# Patient Record
Sex: FEMALE | Race: WHITE | NOT HISPANIC OR LATINO | Employment: UNEMPLOYED | URBAN - METROPOLITAN AREA
[De-identification: names, ages, dates, MRNs, and addresses within clinical notes are randomized per-mention and may not be internally consistent; named-entity substitution may affect disease eponyms.]

---

## 2024-02-01 ENCOUNTER — TELEPHONE (OUTPATIENT)
Dept: INFECTIOUS DISEASES | Facility: CLINIC | Age: 34
End: 2024-02-01
Payer: COMMERCIAL

## 2024-02-01 NOTE — TELEPHONE ENCOUNTER
Attempted to contact pt to schedule appt. Pt did not answer. LVM to rtc to clinic. Pt scheduled first available with Dr. Kenny.

## 2024-02-01 NOTE — TELEPHONE ENCOUNTER
----- Message from Foreign Broussard sent at 2/1/2024  3:33 PM CST -----  Contact: Brook  Pt is calling in regards to getting appt for health issues due salmonella poison. Also, Pt neurologist well send referral if needed. Pt can be reached at [phone].      Thanks  GORDON

## 2024-02-07 ENCOUNTER — OFFICE VISIT (OUTPATIENT)
Dept: INFECTIOUS DISEASES | Facility: CLINIC | Age: 34
End: 2024-02-07
Payer: COMMERCIAL

## 2024-02-07 ENCOUNTER — LAB VISIT (OUTPATIENT)
Dept: LAB | Facility: HOSPITAL | Age: 34
End: 2024-02-07
Attending: STUDENT IN AN ORGANIZED HEALTH CARE EDUCATION/TRAINING PROGRAM
Payer: COMMERCIAL

## 2024-02-07 VITALS
DIASTOLIC BLOOD PRESSURE: 75 MMHG | WEIGHT: 129 LBS | BODY MASS INDEX: 20.73 KG/M2 | HEIGHT: 66 IN | SYSTOLIC BLOOD PRESSURE: 117 MMHG | HEART RATE: 70 BPM

## 2024-02-07 DIAGNOSIS — M79.2 NEURITIS: ICD-10-CM

## 2024-02-07 DIAGNOSIS — M79.2 NEURITIS: Primary | ICD-10-CM

## 2024-02-07 PROCEDURE — 87340 HEPATITIS B SURFACE AG IA: CPT | Performed by: STUDENT IN AN ORGANIZED HEALTH CARE EDUCATION/TRAINING PROGRAM

## 2024-02-07 PROCEDURE — 86635 COCCIDIOIDES ANTIBODY: CPT | Performed by: STUDENT IN AN ORGANIZED HEALTH CARE EDUCATION/TRAINING PROGRAM

## 2024-02-07 PROCEDURE — 1159F MED LIST DOCD IN RCRD: CPT | Mod: CPTII,S$GLB,, | Performed by: STUDENT IN AN ORGANIZED HEALTH CARE EDUCATION/TRAINING PROGRAM

## 2024-02-07 PROCEDURE — 87040 BLOOD CULTURE FOR BACTERIA: CPT | Mod: 59 | Performed by: STUDENT IN AN ORGANIZED HEALTH CARE EDUCATION/TRAINING PROGRAM

## 2024-02-07 PROCEDURE — 86704 HEP B CORE ANTIBODY TOTAL: CPT | Performed by: STUDENT IN AN ORGANIZED HEALTH CARE EDUCATION/TRAINING PROGRAM

## 2024-02-07 PROCEDURE — 86403 PARTICLE AGGLUT ANTBDY SCRN: CPT | Mod: 91 | Performed by: STUDENT IN AN ORGANIZED HEALTH CARE EDUCATION/TRAINING PROGRAM

## 2024-02-07 PROCEDURE — 86706 HEP B SURFACE ANTIBODY: CPT | Performed by: STUDENT IN AN ORGANIZED HEALTH CARE EDUCATION/TRAINING PROGRAM

## 2024-02-07 PROCEDURE — 87536 HIV-1 QUANT&REVRSE TRNSCRPJ: CPT | Performed by: STUDENT IN AN ORGANIZED HEALTH CARE EDUCATION/TRAINING PROGRAM

## 2024-02-07 PROCEDURE — 87449 NOS EACH ORGANISM AG IA: CPT | Mod: 91 | Performed by: STUDENT IN AN ORGANIZED HEALTH CARE EDUCATION/TRAINING PROGRAM

## 2024-02-07 PROCEDURE — 99999 PR PBB SHADOW E&M-EST. PATIENT-LVL III: CPT | Mod: PBBFAC,,, | Performed by: STUDENT IN AN ORGANIZED HEALTH CARE EDUCATION/TRAINING PROGRAM

## 2024-02-07 PROCEDURE — 99203 OFFICE O/P NEW LOW 30 MIN: CPT | Mod: S$GLB,,, | Performed by: STUDENT IN AN ORGANIZED HEALTH CARE EDUCATION/TRAINING PROGRAM

## 2024-02-07 PROCEDURE — 86618 LYME DISEASE ANTIBODY: CPT | Performed by: STUDENT IN AN ORGANIZED HEALTH CARE EDUCATION/TRAINING PROGRAM

## 2024-02-07 PROCEDURE — 86780 TREPONEMA PALLIDUM: CPT | Performed by: STUDENT IN AN ORGANIZED HEALTH CARE EDUCATION/TRAINING PROGRAM

## 2024-02-07 PROCEDURE — 86638 Q FEVER ANTIBODY: CPT | Performed by: STUDENT IN AN ORGANIZED HEALTH CARE EDUCATION/TRAINING PROGRAM

## 2024-02-07 PROCEDURE — 86789 WEST NILE VIRUS ANTIBODY: CPT | Performed by: STUDENT IN AN ORGANIZED HEALTH CARE EDUCATION/TRAINING PROGRAM

## 2024-02-07 PROCEDURE — 3074F SYST BP LT 130 MM HG: CPT | Mod: CPTII,S$GLB,, | Performed by: STUDENT IN AN ORGANIZED HEALTH CARE EDUCATION/TRAINING PROGRAM

## 2024-02-07 PROCEDURE — 86790 VIRUS ANTIBODY NOS: CPT | Performed by: STUDENT IN AN ORGANIZED HEALTH CARE EDUCATION/TRAINING PROGRAM

## 2024-02-07 PROCEDURE — 86803 HEPATITIS C AB TEST: CPT | Performed by: STUDENT IN AN ORGANIZED HEALTH CARE EDUCATION/TRAINING PROGRAM

## 2024-02-07 PROCEDURE — 86622 BRUCELLA ANTIBODY: CPT | Mod: 91 | Performed by: STUDENT IN AN ORGANIZED HEALTH CARE EDUCATION/TRAINING PROGRAM

## 2024-02-07 PROCEDURE — 87305 ASPERGILLUS AG IA: CPT | Performed by: STUDENT IN AN ORGANIZED HEALTH CARE EDUCATION/TRAINING PROGRAM

## 2024-02-07 PROCEDURE — 3078F DIAST BP <80 MM HG: CPT | Mod: CPTII,S$GLB,, | Performed by: STUDENT IN AN ORGANIZED HEALTH CARE EDUCATION/TRAINING PROGRAM

## 2024-02-07 PROCEDURE — 86611 BARTONELLA ANTIBODY: CPT | Mod: 91 | Performed by: STUDENT IN AN ORGANIZED HEALTH CARE EDUCATION/TRAINING PROGRAM

## 2024-02-07 PROCEDURE — 87449 NOS EACH ORGANISM AG IA: CPT | Performed by: STUDENT IN AN ORGANIZED HEALTH CARE EDUCATION/TRAINING PROGRAM

## 2024-02-07 PROCEDURE — 3008F BODY MASS INDEX DOCD: CPT | Mod: CPTII,S$GLB,, | Performed by: STUDENT IN AN ORGANIZED HEALTH CARE EDUCATION/TRAINING PROGRAM

## 2024-02-07 PROCEDURE — 86635 COCCIDIOIDES ANTIBODY: CPT | Mod: 91 | Performed by: STUDENT IN AN ORGANIZED HEALTH CARE EDUCATION/TRAINING PROGRAM

## 2024-02-07 PROCEDURE — 86592 SYPHILIS TEST NON-TREP QUAL: CPT | Performed by: STUDENT IN AN ORGANIZED HEALTH CARE EDUCATION/TRAINING PROGRAM

## 2024-02-07 RX ORDER — TOPIRAMATE 25 MG/1
1 TABLET ORAL DAILY
COMMUNITY
Start: 2024-02-01

## 2024-02-07 NOTE — ASSESSMENT & PLAN NOTE
--Using as umbrella term for patient's constellation of symptoms  --Unclear etiology  --CSF studies 2023 within normal limits  --MRI brain has been unrevealing   --Infectious differential includes but not limited to: HIV, West Nile, CMV, syphilis, Lyme, Brucella, and Cryptococcus   --Will order battery of serologic testing today   --As results come back will update patient   --Continue close neurology follow up   --Follow up with ID in 1 month

## 2024-02-07 NOTE — PROGRESS NOTES
Infectious Disease Clinic Note    Patient Name: Brook Abraham  YOB: 1990    PRESENTING HISTORY       History of Present Illness:  Ms. Brook Abraham is a 33 y.o. female w/ significant PMHx of PCOS here with concern regarding a constellation of neurologic symptoms for which she has not received a diagnosis. For over a year has been having symptoms of weakness and neuritis. Testing came back positive for certain infectious exposures back in mid . HSV, EBV, and Salmonella testing positive. Discussed results today. Likely had Salmonella gastroenteritis and mononucleosis in 2023 which would have both self resolved. Continues to have GI complications but in the realm of constipation. Always feels tired. Constantly losing balance which is worse past 2 months. Almost falls instantly when turning corners or getting up. Depth perception is gone. Not able to perceive the color red. Has been seeing neurologist. LP last year reviewed and normal. No fevers or chills. In the past year went on cruise to [facility] [place] in September and visited [place] for mother's . No farm animals where patient lives. Has 2 dogs. No gardening or yard work. No fishing, hiking, or camping. Patient denies breaking out in rashes. Has chronic nasal dryness. BP remains low normal. Has received all childhood vaccines. No sick contacts. Unclear if infection is etiology of neurologic symptoms. Explained that certain fungal and viral organisms are known to cause neurologic symptoms over time. Certain bacteria such as Treponema, Borrelia, and Brucella can also manifest neurologically. Patient denies tick exposure but has been regularly in cow pastures for paint ball. Will order battery of serologic testing today. Patient understands diagnosis remains unclear but will await test results.     Review of Systems:  Constitutional: no fever or chills  Eyes: chronic optic nerve damage   ENT: no nasal congestion or sore  throat  Respiratory: always coughing and short of breath   Cardiovascular: no chest pain  Gastrointestinal: no nausea or vomiting, no abdominal pain, constipation   Genitourinary: no hematuria or dysuria  Musculoskeletal: no arthralgias or myalgias  Skin: no rash  Neurological: migraines, neuritis, right eye discomfort, depth and red perception absent     The following portions of the patient's history were reviewed and updated as appropriate: allergies, current medications, past family history, past medical history, past social history, past surgical history, and problem list.    PAST HISTORY:       There is no immunization history on file for this patient.    Past Medical History:   Diagnosis Date    PCOS (polycystic ovarian syndrome)        Past Surgical History:   Procedure Laterality Date    DIAGNOSTIC LAPAROSCOPY         Family History   Problem Relation Age of Onset    Diabetes Paternal Grandfather     Diabetes Paternal Grandmother     Diabetes Maternal Grandmother     Breast cancer Maternal Grandmother     Diabetes Maternal Grandfather     Diabetes Mother     Ovarian cancer Mother        Social History     Socioeconomic History    Marital status:    Tobacco Use    Smoking status: Former     Types: Cigarettes    Smokeless tobacco: Never   Substance and Sexual Activity    Alcohol use: Yes    Drug use: Never    Sexual activity: Yes     Partners: Male       MEDICATIONS & ALLERGIES:     Current Outpatient Medications on File Prior to Visit   Medication Sig    topiramate (TOPAMAX) 25 MG tablet Take 1 tablet by mouth once daily.    [DISCONTINUED] etonogestreL-ethinyl estradioL (NUVARING) 0.12-0.015 mg/24 hr vaginal ring Place 1 each vaginally every 28 days.     No current facility-administered medications on file prior to visit.       Review of patient's allergies indicates:   Allergen Reactions    Opioids - morphine analogues Anaphylaxis    Sulfa (sulfonamide antibiotics) Anaphylaxis, Hives and Shortness Of  "Breath       OBJECTIVE:   Vital Signs:  Vitals:    02/07/24 0805   BP: 117/75   Pulse: 70   Weight: 58.5 kg (129 lb)   Height: 5' 6" (1.676 m)       No results found for this or any previous visit (from the past 24 hour(s)).      Physical Exam:   General:  Well developed, well nourished, no acute distress  HEENT:  Normocephalic, atraumatic  CVS:  RRR, S1 and S2 normal, no murmurs, rubs, gallops  Resp:  Lungs clear to auscultation, no wheezes, rales, rhonchi  GI:  Abdomen soft, non-tender, non-distended, normoactive bowel sounds, no masses  MSK:  No muscle atrophy, peripheral edema, full range of motion  Skin:  No rashes, ulcers, erythema  Psych:  Alert and oriented to person, place, and time    ASSESSMENT:     Neuritis  --Using as umbrella term for patient's constellation of symptoms  --Unclear etiology  --CSF studies 2023 within normal limits  --MRI brain has been unrevealing   --Infectious differential includes but not limited to: HIV, West Nile, CMV, syphilis, Lyme, Brucella, and Cryptococcus   --Will order battery of serologic testing today   --As results come back will update patient   --Continue close neurology follow up   --Follow up with ID in 1 month       PLAN:     Brook was seen today for consult.    Diagnoses and all orders for this visit:    Neuritis  -     Hepatitis A antibody, IgG; Future  -     Hepatitis B Core Antibody, Total; Future  -     Hepatitis B Surface Ab, Qualitative; Future  -     Hepatitis B Surface Antigen; Future  -     Hepatitis C Antibody; Future  -     HIV RNA, Quantitative, PCR; Future  -     Blood culture; Future  -     Blood culture; Future  -     Cryptococcal antigen, blood; Future  -     RPR; Future  -     FTA antibodies, IgG and IgM; Future  -     Quantiferon Gold TB; Future  -     Fungal Immunodiffusion - Blood; Future  -     Fungitell Assay For (1.3)-B-D-Glucans; Future  -     Histoplasma antigen, serum; Future  -     Histoplasma antigen, urine; Future  -     Aspergillus " Antigen; Future  -     Blastomyces Antigen, Urine; Future  -     Blastomyces Dermatitidis Ag, Blood; Future  -     Cryptococcal antigen, blood; Future  -     Coccidioides Antibody, Serum; Future  -     BRUCELLA ANTIBODIES, IGG, IGM; Future  -     Q Fever Ab Screen w/Titer Reflex; Future  -     BARTONELLA ANITBODY PANEL; Future  -     LYME DISEASE ANTIBODY BY EIA; Future  -     WEST NILE ANTIBODIES, IGG AND IGM; Future  -     Coccidioides Antibody, Serum  -     CMV DNA, Quantitative, PCR; Future  -     Coccidioides Ab with Reflex; Future          The total time for evaluation and management services performed on 2/7/24 was greater than 45 minutes.        Prince Kenny, DO   Infectious Diseases       stretcher

## 2024-02-08 LAB
1,3 BETA GLUCAN SER-MCNC: <31 PG/ML
C IMMITIS AB SER QL IA: NEGATIVE
CRYPTOC AG SER QL LA: NEGATIVE
CRYPTOC AG SER QL LA: NEGATIVE
FUNGITELL COMMENTS: NEGATIVE
GALACTOMANNAN AG SERPL IA-ACNC: <0.5 INDEX
HAV IGG SER QL IA: REACTIVE
HBV CORE AB SERPL QL IA: NORMAL
HBV SURFACE AB SER-ACNC: <3 MIU/ML
HBV SURFACE AB SER-ACNC: NORMAL M[IU]/ML
HBV SURFACE AG SERPL QL IA: NORMAL
HCV AB SERPL QL IA: NORMAL
HIV1 RNA # SERPL NAA+PROBE: NOT DETECTED COPIES/ML
HIV1 RNA SERPL QL NAA+PROBE: NOT DETECTED
RPR SER QL: NORMAL

## 2024-02-09 LAB
B HENSELAE IGG TITR SER IF: NORMAL TITER
B HENSELAE IGM TITR SER IF: NORMAL TITER
B QUINTANA IGG TITR SER IF: NORMAL TITER
B QUINTANA IGM TITR SER IF: NORMAL TITER
HISTOPLASMA/BLASTOMYCES AG VALUE: NOT DETECTED NG/ML
HISTOPLASMA/BLASTOMYCES RESULT: NOT DETECTED
T PALLIDUM AB SER QL IF: NORMAL
WEST NILE VIRUS INTERPRETATION: NORMAL
WNV IGG SER QL IA: NEGATIVE
WNV IGM SER QL IA: NEGATIVE

## 2024-02-10 LAB — B BURGDOR AB SER IA-ACNC: 0.07 INDEX VALUE

## 2024-02-11 LAB — C BURNET AB SER QL IA: NEGATIVE

## 2024-02-12 LAB
ASPERGILLUS AB SER QL ID: NOT DETECTED
B DERMAT AB SER QL ID: NOT DETECTED
BACTERIA BLD CULT: NORMAL
BACTERIA BLD CULT: NORMAL
C IMMITIS AB SER QL ID: NOT DETECTED
H CAPSUL AB SER QL ID: NOT DETECTED

## 2024-02-13 ENCOUNTER — TELEPHONE (OUTPATIENT)
Dept: OBSTETRICS AND GYNECOLOGY | Facility: CLINIC | Age: 34
End: 2024-02-13
Payer: COMMERCIAL

## 2024-02-13 LAB
BRUCELLA IGG SER QL IA: NEGATIVE
BRUCELLA IGM SER QL IA: NEGATIVE
MICROBIOLOGIST REVIEW: NORMAL

## 2024-03-04 ENCOUNTER — OFFICE VISIT (OUTPATIENT)
Dept: INFECTIOUS DISEASES | Facility: CLINIC | Age: 34
End: 2024-03-04
Payer: COMMERCIAL

## 2024-03-04 VITALS
DIASTOLIC BLOOD PRESSURE: 69 MMHG | HEIGHT: 66 IN | TEMPERATURE: 98 F | RESPIRATION RATE: 16 BRPM | HEART RATE: 85 BPM | OXYGEN SATURATION: 99 % | SYSTOLIC BLOOD PRESSURE: 120 MMHG | WEIGHT: 132.81 LBS | BODY MASS INDEX: 21.34 KG/M2

## 2024-03-04 DIAGNOSIS — H93.11 TINNITUS AURIUM, RIGHT: ICD-10-CM

## 2024-03-04 DIAGNOSIS — M79.2 NEURITIS: Primary | ICD-10-CM

## 2024-03-04 PROCEDURE — 3078F DIAST BP <80 MM HG: CPT | Mod: CPTII,S$GLB,, | Performed by: STUDENT IN AN ORGANIZED HEALTH CARE EDUCATION/TRAINING PROGRAM

## 2024-03-04 PROCEDURE — 1159F MED LIST DOCD IN RCRD: CPT | Mod: CPTII,S$GLB,, | Performed by: STUDENT IN AN ORGANIZED HEALTH CARE EDUCATION/TRAINING PROGRAM

## 2024-03-04 PROCEDURE — 99999 PR PBB SHADOW E&M-EST. PATIENT-LVL IV: CPT | Mod: PBBFAC,,, | Performed by: STUDENT IN AN ORGANIZED HEALTH CARE EDUCATION/TRAINING PROGRAM

## 2024-03-04 PROCEDURE — 3008F BODY MASS INDEX DOCD: CPT | Mod: CPTII,S$GLB,, | Performed by: STUDENT IN AN ORGANIZED HEALTH CARE EDUCATION/TRAINING PROGRAM

## 2024-03-04 PROCEDURE — 3074F SYST BP LT 130 MM HG: CPT | Mod: CPTII,S$GLB,, | Performed by: STUDENT IN AN ORGANIZED HEALTH CARE EDUCATION/TRAINING PROGRAM

## 2024-03-04 PROCEDURE — 99213 OFFICE O/P EST LOW 20 MIN: CPT | Mod: S$GLB,,, | Performed by: STUDENT IN AN ORGANIZED HEALTH CARE EDUCATION/TRAINING PROGRAM

## 2024-03-04 NOTE — H&P (VIEW-ONLY)
Infectious Disease Clinic Note    Patient Name: Brook Abraham  YOB: 1990    PRESENTING HISTORY       History of Present Illness:  Ms. Brook Abraham is a 33 y.o. female w/ significant PMHx of PCOS here with concern regarding a constellation of neurologic symptoms for which she has not received a diagnosis. For over a year has been having symptoms of weakness and neuritis. Testing came back positive for certain infectious exposures back in mid . HSV, EBV, and Salmonella testing positive. Discussed results today. Likely had Salmonella gastroenteritis and mononucleosis in 2023 which would have both self resolved. Continues to have GI complications but in the realm of constipation. Always feels tired. Constantly losing balance which is worse past 2 months. Almost falls instantly when turning corners or getting up. Depth perception is gone. Not able to perceive the color red. Has been seeing neurologist. LP last year reviewed and normal. No fevers or chills. In the past year went on cruise to [facility] in September and visited [place] for mother's . No farm animals where patient lives. Has 2 dogs. No gardening or yard work. No fishing, hiking, or camping. Patient denies breaking out in rashes. Has chronic nasal dryness. BP remains low normal. Has received all childhood vaccines. No sick contacts. Unclear if infection is etiology of neurologic symptoms. Explained that certain fungal and viral organisms are known to cause neurologic symptoms over time. Certain bacteria such as Treponema, Borrelia, and Brucella can also manifest neurologically. Patient denies tick exposure but has been regularly in cow pastures for paint ball. Will order battery of serologic testing today. Patient understands diagnosis remains unclear but will await test results.      3/4/24: Due for hep B vaccine series. Broad infectious workup has resulted negative. Feels hormones may exacerbate right sided neurologic  symptoms. Right eye pain and right ear ringing. Gets louder then gets softer. No overt migraines but feels Topamax does not help. Notably pupils lack response on exam today. Will repeat LP and send to ENT for further evaluation of tinnitus. Sees ophthalmologist.       Review of Systems:  Constitutional: no fever or chills  Eyes: chronic optic nerve damage   ENT: no nasal congestion or sore throat; right tinnitus   Respiratory: always coughing and short of breath   Cardiovascular: no chest pain  Gastrointestinal: no nausea or vomiting, no abdominal pain  Genitourinary: no hematuria or dysuria  Musculoskeletal: no arthralgias or myalgias  Skin: no rash  Neurological: migraines, neuritis, right eye discomfort, depth and red perception absent, shooting pains into right side of head     The following portions of the patient's history were reviewed and updated as appropriate: allergies, current medications, past family history, past medical history, past social history, past surgical history, and problem list.    PAST HISTORY:       There is no immunization history on file for this patient.    Past Medical History:   Diagnosis Date    PCOS (polycystic ovarian syndrome)        Past Surgical History:   Procedure Laterality Date    DIAGNOSTIC LAPAROSCOPY         Family History   Problem Relation Age of Onset    Diabetes Paternal Grandfather     Diabetes Paternal Grandmother     Diabetes Maternal Grandmother     Breast cancer Maternal Grandmother     Diabetes Maternal Grandfather     Diabetes Mother     Ovarian cancer Mother        Social History     Socioeconomic History    Marital status:    Tobacco Use    Smoking status: Former     Types: Cigarettes    Smokeless tobacco: Never   Substance and Sexual Activity    Alcohol use: Yes    Drug use: Never    Sexual activity: Yes     Partners: Male       MEDICATIONS & ALLERGIES:     Current Outpatient Medications on File Prior to Visit   Medication Sig    topiramate (TOPAMAX)  "25 MG tablet Take 1 tablet by mouth once daily.     No current facility-administered medications on file prior to visit.       Review of patient's allergies indicates:   Allergen Reactions    Opioids - morphine analogues Anaphylaxis    Sulfa (sulfonamide antibiotics) Anaphylaxis, Hives and Shortness Of Breath       OBJECTIVE:   Vital Signs:  Vitals:    03/04/24 0910   BP: 120/69   Pulse: 85   Resp: 16   Temp: 97.5 °F (36.4 °C)   TempSrc: Oral   SpO2: 99%   Weight: 60.2 kg (132 lb 12.8 oz)   Height: 5' 6" (1.676 m)       No results found for this or any previous visit (from the past 24 hour(s)).      Physical Exam:   General:  Well developed, well nourished, no acute distress  HEENT:  Normocephalic, atraumatic, EOMI, clear sclera, throat clear without erythema or exudates; diminished bilateral pupillary response   CVS:  RRR, S1 and S2 normal, no murmurs, rubs, gallops  Resp:  Lungs clear to auscultation, no wheezes, rales, rhonchi  GI:  Abdomen soft, non-tender, non-distended, normoactive bowel sounds, no masses  MSK:  No muscle atrophy, peripheral edema, full range of motion  Skin:  No rashes, ulcers, erythema  Psych:  Alert and oriented to person, place, and time    ASSESSMENT:     Neuritis  --Using as umbrella term for patient's constellation of symptoms  --Unclear etiology  --CSF studies 2023 within normal limits  --MRI brain has been unrevealing   --Infectious serologies unrevealing   --Continue close neurology follow up   --Will recheck LP mainly for OP but also for infectious studies   --Follow up with ID as needed     Tinnitus, right   Will ask ENT to evaluate       PLAN:     Brook was seen today for follow-up.    Diagnoses and all orders for this visit:    Neuritis  -     Ambulatory referral/consult to ENT; Future  -     FL Lumbar Puncture (xpd); Future  -     Gram stain  -     AFB Culture & Smear  -     Fungus culture  -     CSF culture  -     Protein, CSF  -     Glucose, CSF  -     CSF cell count with " differential  -     Cytology, Fluid/Wash/Brush  -     Meningitis - Encephalitis Panel, CSF  -     WEST NILE VIRUS, PCR, CSF  -     WNV ANTIBODIES, CSF    Tinnitus aurium, right  -     Ambulatory referral/consult to ENT; Future          The total time for evaluation and management services performed on 3/4/24 was greater than 25 minutes.        Prince Kenny, DO   Infectious Diseases

## 2024-03-04 NOTE — PROGRESS NOTES
Infectious Disease Clinic Note    Patient Name: Brook Abraham  YOB: 1990    PRESENTING HISTORY       History of Present Illness:  Ms. Brook Abraham is a 33 y.o. female w/ significant PMHx of PCOS here with concern regarding a constellation of neurologic symptoms for which she has not received a diagnosis. For over a year has been having symptoms of weakness and neuritis. Testing came back positive for certain infectious exposures back in mid . HSV, EBV, and Salmonella testing positive. Discussed results today. Likely had Salmonella gastroenteritis and mononucleosis in 2023 which would have both self resolved. Continues to have GI complications but in the realm of constipation. Always feels tired. Constantly losing balance which is worse past 2 months. Almost falls instantly when turning corners or getting up. Depth perception is gone. Not able to perceive the color red. Has been seeing neurologist. LP last year reviewed and normal. No fevers or chills. In the past year went on cruise to [facility] in September and visited [place] for mother's . No farm animals where patient lives. Has 2 dogs. No gardening or yard work. No fishing, hiking, or camping. Patient denies breaking out in rashes. Has chronic nasal dryness. BP remains low normal. Has received all childhood vaccines. No sick contacts. Unclear if infection is etiology of neurologic symptoms. Explained that certain fungal and viral organisms are known to cause neurologic symptoms over time. Certain bacteria such as Treponema, Borrelia, and Brucella can also manifest neurologically. Patient denies tick exposure but has been regularly in cow pastures for paint ball. Will order battery of serologic testing today. Patient understands diagnosis remains unclear but will await test results.      3/4/24: Due for hep B vaccine series. Broad infectious workup has resulted negative. Feels hormones may exacerbate right sided neurologic  symptoms. Right eye pain and right ear ringing. Gets louder then gets softer. No overt migraines but feels Topamax does not help. Notably pupils lack response on exam today. Will repeat LP and send to ENT for further evaluation of tinnitus. Sees ophthalmologist.       Review of Systems:  Constitutional: no fever or chills  Eyes: chronic optic nerve damage   ENT: no nasal congestion or sore throat; right tinnitus   Respiratory: always coughing and short of breath   Cardiovascular: no chest pain  Gastrointestinal: no nausea or vomiting, no abdominal pain  Genitourinary: no hematuria or dysuria  Musculoskeletal: no arthralgias or myalgias  Skin: no rash  Neurological: migraines, neuritis, right eye discomfort, depth and red perception absent, shooting pains into right side of head     The following portions of the patient's history were reviewed and updated as appropriate: allergies, current medications, past family history, past medical history, past social history, past surgical history, and problem list.    PAST HISTORY:       There is no immunization history on file for this patient.    Past Medical History:   Diagnosis Date    PCOS (polycystic ovarian syndrome)        Past Surgical History:   Procedure Laterality Date    DIAGNOSTIC LAPAROSCOPY         Family History   Problem Relation Age of Onset    Diabetes Paternal Grandfather     Diabetes Paternal Grandmother     Diabetes Maternal Grandmother     Breast cancer Maternal Grandmother     Diabetes Maternal Grandfather     Diabetes Mother     Ovarian cancer Mother        Social History     Socioeconomic History    Marital status:    Tobacco Use    Smoking status: Former     Types: Cigarettes    Smokeless tobacco: Never   Substance and Sexual Activity    Alcohol use: Yes    Drug use: Never    Sexual activity: Yes     Partners: Male       MEDICATIONS & ALLERGIES:     Current Outpatient Medications on File Prior to Visit   Medication Sig    topiramate (TOPAMAX)  "25 MG tablet Take 1 tablet by mouth once daily.     No current facility-administered medications on file prior to visit.       Review of patient's allergies indicates:   Allergen Reactions    Opioids - morphine analogues Anaphylaxis    Sulfa (sulfonamide antibiotics) Anaphylaxis, Hives and Shortness Of Breath       OBJECTIVE:   Vital Signs:  Vitals:    03/04/24 0910   BP: 120/69   Pulse: 85   Resp: 16   Temp: 97.5 °F (36.4 °C)   TempSrc: Oral   SpO2: 99%   Weight: 60.2 kg (132 lb 12.8 oz)   Height: 5' 6" (1.676 m)       No results found for this or any previous visit (from the past 24 hour(s)).      Physical Exam:   General:  Well developed, well nourished, no acute distress  HEENT:  Normocephalic, atraumatic, EOMI, clear sclera, throat clear without erythema or exudates; diminished bilateral pupillary response   CVS:  RRR, S1 and S2 normal, no murmurs, rubs, gallops  Resp:  Lungs clear to auscultation, no wheezes, rales, rhonchi  GI:  Abdomen soft, non-tender, non-distended, normoactive bowel sounds, no masses  MSK:  No muscle atrophy, peripheral edema, full range of motion  Skin:  No rashes, ulcers, erythema  Psych:  Alert and oriented to person, place, and time    ASSESSMENT:     Neuritis  --Using as umbrella term for patient's constellation of symptoms  --Unclear etiology  --CSF studies 2023 within normal limits  --MRI brain has been unrevealing   --Infectious serologies unrevealing   --Continue close neurology follow up   --Will recheck LP mainly for OP but also for infectious studies   --Follow up with ID as needed     Tinnitus, right   Will ask ENT to evaluate       PLAN:     Brook was seen today for follow-up.    Diagnoses and all orders for this visit:    Neuritis  -     Ambulatory referral/consult to ENT; Future  -     FL Lumbar Puncture (xpd); Future  -     Gram stain  -     AFB Culture & Smear  -     Fungus culture  -     CSF culture  -     Protein, CSF  -     Glucose, CSF  -     CSF cell count with " differential  -     Cytology, Fluid/Wash/Brush  -     Meningitis - Encephalitis Panel, CSF  -     WEST NILE VIRUS, PCR, CSF  -     WNV ANTIBODIES, CSF    Tinnitus aurium, right  -     Ambulatory referral/consult to ENT; Future          The total time for evaluation and management services performed on 3/4/24 was greater than 25 minutes.        Prince Kenny, DO   Infectious Diseases

## 2024-03-05 ENCOUNTER — HOSPITAL ENCOUNTER (OUTPATIENT)
Dept: RADIOLOGY | Facility: HOSPITAL | Age: 34
Discharge: HOME | End: 2024-03-05
Attending: STUDENT IN AN ORGANIZED HEALTH CARE EDUCATION/TRAINING PROGRAM
Payer: COMMERCIAL

## 2024-03-05 DIAGNOSIS — M79.2 NEURITIS: ICD-10-CM

## 2024-03-05 LAB
B-HCG UR QL: NEGATIVE
C GATTII+NEOFOR DNA CSF QL NAA+NON-PROBE: NOT DETECTED
CLARITY CSF: CLEAR
CMV DNA CSF QL NAA+NON-PROBE: NOT DETECTED
COLOR CSF: COLORLESS
CSF TUBE NUMBER: 1
CSF TUBE NUMBER: 1
CTP QC/QA: YES
E COLI K1 DNA CSF QL NAA+NON-PROBE: NOT DETECTED
EV RNA CSF QL NAA+NON-PROBE: NOT DETECTED
GLUCOSE CSF-MCNC: 48 MG/DL (ref 40–70)
GP B STREP DNA CSF QL NAA+NON-PROBE: NOT DETECTED
HAEM INFLU DNA CSF QL NAA+NON-PROBE: NOT DETECTED
HAEM INFLU DNA CSF QL NAA+NON-PROBE: NOT DETECTED
HHV6 DNA CSF QL NAA+NON-PROBE: NOT DETECTED
HSV1 DNA CSF QL NAA+NON-PROBE: NOT DETECTED
HSV2 DNA CSF QL NAA+NON-PROBE: NOT DETECTED
N MEN DNA CSF QL NAA+NON-PROBE: NOT DETECTED
PARECHOVIRUS A RNA CSF QL NAA+NON-PROBE: NOT DETECTED
PROT CSF-MCNC: 44 MG/DL (ref 15–40)
RBC # CSF: 2 /CU MM
S PNEUM DNA CSF QL NAA+NON-PROBE: NOT DETECTED
SPECIMEN VOL CSF: 2 ML
VZV DNA CSF QL NAA+NON-PROBE: NOT DETECTED
WBC # CSF: 3 /CU MM (ref 0–5)

## 2024-03-05 PROCEDURE — 87205 SMEAR GRAM STAIN: CPT | Performed by: STUDENT IN AN ORGANIZED HEALTH CARE EDUCATION/TRAINING PROGRAM

## 2024-03-05 PROCEDURE — 87798 DETECT AGENT NOS DNA AMP: CPT | Performed by: STUDENT IN AN ORGANIZED HEALTH CARE EDUCATION/TRAINING PROGRAM

## 2024-03-05 PROCEDURE — 89051 BODY FLUID CELL COUNT: CPT | Performed by: STUDENT IN AN ORGANIZED HEALTH CARE EDUCATION/TRAINING PROGRAM

## 2024-03-05 PROCEDURE — 82945 GLUCOSE OTHER FLUID: CPT | Performed by: STUDENT IN AN ORGANIZED HEALTH CARE EDUCATION/TRAINING PROGRAM

## 2024-03-05 PROCEDURE — 87206 SMEAR FLUORESCENT/ACID STAI: CPT | Mod: 59 | Performed by: STUDENT IN AN ORGANIZED HEALTH CARE EDUCATION/TRAINING PROGRAM

## 2024-03-05 PROCEDURE — 81025 URINE PREGNANCY TEST: CPT | Performed by: RADIOLOGY

## 2024-03-05 PROCEDURE — 87116 MYCOBACTERIA CULTURE: CPT | Performed by: STUDENT IN AN ORGANIZED HEALTH CARE EDUCATION/TRAINING PROGRAM

## 2024-03-05 PROCEDURE — 62328 DX LMBR SPI PNXR W/FLUOR/CT: CPT | Mod: ,,, | Performed by: RADIOLOGY

## 2024-03-05 PROCEDURE — 88108 CYTOPATH CONCENTRATE TECH: CPT | Performed by: STUDENT IN AN ORGANIZED HEALTH CARE EDUCATION/TRAINING PROGRAM

## 2024-03-05 PROCEDURE — 88108 CYTOPATH CONCENTRATE TECH: CPT | Mod: 26,,, | Performed by: STUDENT IN AN ORGANIZED HEALTH CARE EDUCATION/TRAINING PROGRAM

## 2024-03-05 PROCEDURE — 87070 CULTURE OTHR SPECIMN AEROBIC: CPT | Performed by: STUDENT IN AN ORGANIZED HEALTH CARE EDUCATION/TRAINING PROGRAM

## 2024-03-05 PROCEDURE — 62328 DX LMBR SPI PNXR W/FLUOR/CT: CPT

## 2024-03-05 PROCEDURE — 87102 FUNGUS ISOLATION CULTURE: CPT | Performed by: STUDENT IN AN ORGANIZED HEALTH CARE EDUCATION/TRAINING PROGRAM

## 2024-03-05 PROCEDURE — 87483 CNS DNA AMP PROBE TYPE 12-25: CPT | Performed by: STUDENT IN AN ORGANIZED HEALTH CARE EDUCATION/TRAINING PROGRAM

## 2024-03-05 PROCEDURE — 84157 ASSAY OF PROTEIN OTHER: CPT | Performed by: STUDENT IN AN ORGANIZED HEALTH CARE EDUCATION/TRAINING PROGRAM

## 2024-03-05 PROCEDURE — 86788 WEST NILE VIRUS AB IGM: CPT | Performed by: STUDENT IN AN ORGANIZED HEALTH CARE EDUCATION/TRAINING PROGRAM

## 2024-03-05 NOTE — DISCHARGE SUMMARY
[facility] (Hospital)  Discharge Note  Short Stay    FL LUMBAR PUNCTURE DIAGNOSTIC WITH IMAGING      OUTCOME: Patient tolerated treatment/procedure well without complication and is now ready for discharge.    DISPOSITION: Home or Self Care    FINAL DIAGNOSIS:  <principal problem not specified>    FOLLOWUP: In clinic    DISCHARGE INSTRUCTIONS:  No discharge procedures on file.     TIME SPENT ON DISCHARGE: 15 minutes    Pre Op Diagnosis: neurological symptoms     Post Op Diagnosis: same     Procedure:  LP     Procedure performed by: Anastasiia MEDINA, Jose HICKS     Written Informed Consent Obtained: Yes     Specimen Removed:  yes     Estimated Blood Loss:  minimal     Findings: Local anesthesia     Sedation:  no     The patient tolerated the procedure well and there were no complications.      Disposition:  F/U in clinic or with ordering physician    Discharge instructions:  Light activity for 24 hours.  Remove band aid in 24 hours.  No baths (showers are appropriate).      Sterile technique was performed in the lower back, lidocaine was used as a local anesthetic.  OP 14 cm of h2o, 11 ccs of clear csf to lab.  Pt tolerated the procedure well without immediate complications.  Please see radiologist report for details. F/u with PCP and/or ordering physician.

## 2024-03-06 LAB
FINAL PATHOLOGIC DIAGNOSIS: NORMAL
Lab: NORMAL

## 2024-03-07 LAB — WNV RNA CSF QL NAA+PROBE: NEGATIVE

## 2024-03-08 LAB
WEST NILE VIRUS CSF INTERP: NORMAL
WNV IGG CSF QL: NEGATIVE
WNV IGM CSF QL IA: NEGATIVE

## 2024-03-10 LAB
BACTERIA CSF CULT: NO GROWTH
GRAM STN SPEC: NORMAL
GRAM STN SPEC: NORMAL

## 2024-03-11 ENCOUNTER — OFFICE VISIT (OUTPATIENT)
Dept: OTOLARYNGOLOGY | Facility: CLINIC | Age: 34
End: 2024-03-11
Payer: COMMERCIAL

## 2024-03-11 ENCOUNTER — CLINICAL SUPPORT (OUTPATIENT)
Dept: AUDIOLOGY | Facility: CLINIC | Age: 34
End: 2024-03-11
Payer: COMMERCIAL

## 2024-03-11 VITALS — TEMPERATURE: 98 F | WEIGHT: 127.19 LBS | HEIGHT: 66 IN | BODY MASS INDEX: 20.44 KG/M2

## 2024-03-11 DIAGNOSIS — H93.11 TINNITUS AURIUM, RIGHT: ICD-10-CM

## 2024-03-11 DIAGNOSIS — H90.41 SENSORINEURAL HEARING LOSS (SNHL) OF RIGHT EAR WITH UNRESTRICTED HEARING OF LEFT EAR: Primary | ICD-10-CM

## 2024-03-11 DIAGNOSIS — M79.2 NEURITIS: ICD-10-CM

## 2024-03-11 DIAGNOSIS — H93.11 TINNITUS, RIGHT: ICD-10-CM

## 2024-03-11 PROCEDURE — 92567 TYMPANOMETRY: CPT | Mod: S$GLB,,, | Performed by: AUDIOLOGIST-HEARING AID FITTER

## 2024-03-11 PROCEDURE — 1159F MED LIST DOCD IN RCRD: CPT | Mod: CPTII,S$GLB,, | Performed by: STUDENT IN AN ORGANIZED HEALTH CARE EDUCATION/TRAINING PROGRAM

## 2024-03-11 PROCEDURE — 99999 PR PBB SHADOW E&M-EST. PATIENT-LVL III: CPT | Mod: PBBFAC,,, | Performed by: STUDENT IN AN ORGANIZED HEALTH CARE EDUCATION/TRAINING PROGRAM

## 2024-03-11 PROCEDURE — 99999 PR PBB SHADOW E&M-EST. PATIENT-LVL I: CPT | Mod: PBBFAC,,, | Performed by: AUDIOLOGIST-HEARING AID FITTER

## 2024-03-11 PROCEDURE — 99204 OFFICE O/P NEW MOD 45 MIN: CPT | Mod: S$GLB,,, | Performed by: STUDENT IN AN ORGANIZED HEALTH CARE EDUCATION/TRAINING PROGRAM

## 2024-03-11 PROCEDURE — 3008F BODY MASS INDEX DOCD: CPT | Mod: CPTII,S$GLB,, | Performed by: STUDENT IN AN ORGANIZED HEALTH CARE EDUCATION/TRAINING PROGRAM

## 2024-03-11 PROCEDURE — 92557 COMPREHENSIVE HEARING TEST: CPT | Mod: S$GLB,,, | Performed by: AUDIOLOGIST-HEARING AID FITTER

## 2024-03-11 RX ORDER — PREDNISONE 10 MG/1
TABLET ORAL
Qty: 65 TABLET | Refills: 0 | Status: SHIPPED | OUTPATIENT
Start: 2024-03-11

## 2024-03-11 NOTE — PROGRESS NOTES
Chief complaint:   Chief Complaint   Patient presents with    Tinnitus     R ear ringing ongoing X 2 weeks. Patient also c/o R side head pain to her eye to around her neck ongoing for a while.          Referring Provider:  Kendrick Kenny Do  [id number] [place] [address]    History of Present Illness:     Ms. Abraham is a 33 y.o. female presenting for evaluation of tinnitus.     Patient presents with tinnitus. Onset of symptoms was abrupt starting 2 weeks ago with stable course since that time. Patient describes the tinnitus as fluctuated located in the right ear. The quality is described as variable pitch. The pattern is nonpulsatile with an intensity that is medium. Patient describes her level of annoyance as moderately annoying, always aware. Associated symptoms include right ear popping, muffled hearing (also new over the last two weeks), right-sided facial pain, saloni-orbital and neck (present before this).    Facial and ear pain do improve with medicinal marijuana        History        Past Medical History:   Past Medical History:   Diagnosis Date    PCOS (polycystic ovarian syndrome)     .          Past Surgical History:  Past Surgical History:   Procedure Laterality Date    DIAGNOSTIC LAPAROSCOPY     .         Medications: Medication list was reviewed. She  has a current medication list which includes the following prescription(s): topiramate.         Allergies:   Review of patient's allergies indicates:   Allergen Reactions    Opioids - morphine analogues Anaphylaxis    Sulfa (sulfonamide antibiotics) Anaphylaxis, Hives and Shortness Of Breath            Family history: family history includes Breast cancer in her maternal grandmother; Diabetes in her maternal grandfather, maternal grandmother, mother, paternal grandfather, and paternal grandmother; Ovarian cancer in her mother.         Social History          Alcohol use:  reports current alcohol use.            Tobacco:  reports  that she has quit smoking. Her smoking use included cigarettes. She has never used smokeless tobacco.         Please see the patient's intake form for full details of past medical history, past surgical history, family history, social history and review of systems. ?This information was reviewed by me and noted.      Physical Examination     General: Well developed, well nourished, well hydrated. Verbal with a strong voice and not dysphonic.     Head/Face: Normocephalic, atraumatic. No scars or lesions. Facial musculature equal.     Eyes: No scleral icterus or conjunctival hemorrhage. EOMI. PERRLA.     Ears:     Right ear: No gross deformity. EAC is clear of debris and erythema. The TM is intact with a pneumatized middle ear. No signs of retraction, fluid or infection.      Left ear: No gross deformity. EAC is clear of debris and erythema. The TM is intact with a pneumatized middle ear. No signs of retraction, fluid or infection.       Neurologic: Moving all extremities without gross abnormality.CN II-XII grossly intact. House-Brackmann 1/6. No signs of nystagmus.      Psych: Alert and oriented to person, place, and time with an appropriate mood and affect.       Audiogram     Audiogram  was independently reviewed    Right mild Sensorineural Hearing Loss in the mid to high gz  Type A tymps AU  Excellent word rec AU    Imaging   MRI 2/19/24  FINDINGS: There is no restricted diffusion to suggest acute infarction.  No   focal encephalomalacia.  The brain parenchyma demonstrates no edema, mass, or   mass effect.  No significant white matter signal hyperintensities.  No abnormal   enhancement.   The cerebellar tonsils are in expected location.  The visualized portions of   the sella appear within normal limits.   No intracranial hemorrhage or extra-axial fluid collections.  Stable size and   configuration of the ventricular system.  There is no shift of the midline.   Basal cisterns are patent.  There are preserved  arterial flow-voids on T2   weighted imaging.  The paranasal sinuses and mastoid air cells are clear.  The   globes and orbits appear within normal limits.   Marrow signal is within normal limits.      Assessment     1. Neuritis    2. Tinnitus aurium, right        Plan:      Suspect right ear pain is related to underlying neurologic condition causing right sided facial and neck pain  She has had recent MRI without evidence of IAC lesion    Will treat as a sudden Sensorineural Hearing Loss with high dose steroid taper     f/u 2 weeks with audio prior    Possibly atypical Meniere's as alternative dx          Fredi Mcneill MD  [facility]   [facility]  [address] [facility] [address] [facility].  [place] [id number]  P: [phone]  F: [phone]

## 2024-03-11 NOTE — PROGRESS NOTES
Referring provider: Dr. Osito Deutschelle Jarad was seen 03/11/2024 for an audiological evaluation.  Patient complains of tinnitus and hearing loss in the right ear. Onset of tinnitus was abrupt starting 2 weeks ago with stable course since that time. Patient describes the tinnitus as fluctuated located in the right ear. The quality is described as variable pitch. The pattern is nonpulsatile with an intensity that is medium. Patient describes her level of annoyance as moderately annoying, always aware. Associated symptoms include right ear popping, muffled hearing (also new over the last two weeks), right-sided facial pain, saloni-orbital and neck (present before this). Facial and ear pain do improve with medicinal marijuana. She also endorses dizziness provoked upon standing and movement, and not specifically accompanying with the tinnitus and hearing loss. She has had recent MRI without evidence of IAC lesion. She has also having problems with right eye pain and following with her providers for optic neuritis.     Results reveal a mild sensorineural hearing loss 125-8000 Hz for the right ear, and a borderline normal hearing 125-8000 Hz for the left ear.   Speech Reception Thresholds were 20 dBHL for the right ear and 20 dBHL for the left ear.   Word recognition scores were excellent for the right ear and excellent for the left ear.   Tympanograms were Type A for the right ear and Type A for the left ear.    Patient was counseled on the above findings.    Recommendations:  ENT at completion of this visit, concern for possible SSNHL or ELH.

## 2024-03-12 ENCOUNTER — PATIENT MESSAGE (OUTPATIENT)
Dept: INFECTIOUS DISEASES | Facility: CLINIC | Age: 34
End: 2024-03-12
Payer: COMMERCIAL

## 2024-03-25 ENCOUNTER — OFFICE VISIT (OUTPATIENT)
Dept: OTOLARYNGOLOGY | Facility: CLINIC | Age: 34
End: 2024-03-25
Payer: COMMERCIAL

## 2024-03-25 ENCOUNTER — CLINICAL SUPPORT (OUTPATIENT)
Dept: AUDIOLOGY | Facility: CLINIC | Age: 34
End: 2024-03-25
Payer: COMMERCIAL

## 2024-03-25 VITALS — BODY MASS INDEX: 24.17 KG/M2 | TEMPERATURE: 98 F | HEIGHT: 64 IN | WEIGHT: 141.56 LBS

## 2024-03-25 DIAGNOSIS — H93.8X1 PRESSURE SENSATION IN RIGHT EAR: ICD-10-CM

## 2024-03-25 DIAGNOSIS — H93.11 TINNITUS AURIUM, RIGHT: Primary | ICD-10-CM

## 2024-03-25 DIAGNOSIS — H90.3 SENSORINEURAL HEARING LOSS (SNHL), BILATERAL: ICD-10-CM

## 2024-03-25 DIAGNOSIS — H90.3 SENSORINEURAL HEARING LOSS, BILATERAL: Primary | ICD-10-CM

## 2024-03-25 DIAGNOSIS — H93.11 TINNITUS, RIGHT: ICD-10-CM

## 2024-03-25 PROCEDURE — 92567 TYMPANOMETRY: CPT | Mod: S$GLB,,, | Performed by: AUDIOLOGIST-HEARING AID FITTER

## 2024-03-25 PROCEDURE — 1159F MED LIST DOCD IN RCRD: CPT | Mod: CPTII,S$GLB,, | Performed by: STUDENT IN AN ORGANIZED HEALTH CARE EDUCATION/TRAINING PROGRAM

## 2024-03-25 PROCEDURE — 99214 OFFICE O/P EST MOD 30 MIN: CPT | Mod: S$GLB,,, | Performed by: STUDENT IN AN ORGANIZED HEALTH CARE EDUCATION/TRAINING PROGRAM

## 2024-03-25 PROCEDURE — 3008F BODY MASS INDEX DOCD: CPT | Mod: CPTII,S$GLB,, | Performed by: STUDENT IN AN ORGANIZED HEALTH CARE EDUCATION/TRAINING PROGRAM

## 2024-03-25 PROCEDURE — 92553 AUDIOMETRY AIR & BONE: CPT | Mod: S$GLB,,, | Performed by: AUDIOLOGIST-HEARING AID FITTER

## 2024-03-25 PROCEDURE — 99999 PR PBB SHADOW E&M-EST. PATIENT-LVL III: CPT | Mod: PBBFAC,,, | Performed by: STUDENT IN AN ORGANIZED HEALTH CARE EDUCATION/TRAINING PROGRAM

## 2024-03-25 PROCEDURE — 99999 PR PBB SHADOW E&M-EST. PATIENT-LVL I: CPT | Mod: PBBFAC,,, | Performed by: AUDIOLOGIST-HEARING AID FITTER

## 2024-03-25 NOTE — PROGRESS NOTES
"Referring provider: Dr. Osito Doe Alyse Abraham was seen 03/25/2024 for an audiological evaluation.  Patient returns for followup for suspected right sudden sensorineural hearing loss vs ELH. She reports pressure in her right ear is worse. Hearing and tinnitus in her right ear is unchanged. She also endorses occasional tinnitus in her left ear that started day nine of her steroid taper. No vertigo.     Results reveal a mild to borderline normal sensorineural hearing loss 125-8000 Hz for the right ear, and a mild to borderline normal sensorineural hearing loss 125-8000 Hz for the left ear.   Tympanograms were Type A for the right ear and Type A for the left ear.    There is an improvement in hearing for the right ear since her previous audiogram from 3/11/24. Left is essentially stable.     Patient was counseled on the above findings.    Recommendations:  ENT at completion of this visit: "Pressure may be related to poorly controlled migraine which causes facial pressure on that right side as well. Possible early meniere's (pressure worse sometimes after high salt meals or caffeine), low hz HL. We discussed trying a low salt diet, adjusting migraine medication, and f/u with Ecog if not improved or worsened in 1-2 months."              "

## 2024-03-25 NOTE — PROGRESS NOTES
Chief complaint:   Chief Complaint   Patient presents with    Tinnitus     2 week f/u. Audio review          Referring Provider:  No referring provider defined for this encounter.    History of Present Illness, 3/11/24:     Ms. Abraham is a 33 y.o. female presenting for evaluation of tinnitus.     Patient presents with tinnitus. Onset of symptoms was abrupt starting 2 weeks ago with stable course since that time. Patient describes the tinnitus as fluctuated located in the right ear. The quality is described as variable pitch. The pattern is nonpulsatile with an intensity that is medium. Patient describes her level of annoyance as moderately annoying, always aware. Associated symptoms include right ear popping, muffled hearing (also new over the last two weeks), right-sided facial pain, saloni-orbital and neck (present before this).    Facial and ear pain do improve with medicinal marijuana     Update 3/25/24  She reports pressure in her right ear is worse. Hearing and tinnitus in her right ear is unchanged, to maybe somewhat improved. She also endorses occasional tinnitus in her left ear that started day nine of her steroid taper. No vertigo.     Has been on migraine medication (topimax) for about 1 month without improvement in facial pressure and ear pressure.     History        Past Medical History:   Past Medical History:   Diagnosis Date    PCOS (polycystic ovarian syndrome)     .          Past Surgical History:  Past Surgical History:   Procedure Laterality Date    DIAGNOSTIC LAPAROSCOPY     .         Medications: Medication list was reviewed. She  has a current medication list which includes the following prescription(s): prednisone and topiramate.         Allergies:   Review of patient's allergies indicates:   Allergen Reactions    Opioids - morphine analogues Anaphylaxis    Sulfa (sulfonamide antibiotics) Anaphylaxis, Hives and Shortness Of Breath            Family history: family history includes Breast  cancer in her maternal grandmother; Diabetes in her maternal grandfather, maternal grandmother, mother, paternal grandfather, and paternal grandmother; Ovarian cancer in her mother.         Social History          Alcohol use:  reports current alcohol use.            Tobacco:  reports that she has quit smoking. Her smoking use included cigarettes. She has never used smokeless tobacco.         Please see the patient's intake form for full details of past medical history, past surgical history, family history, social history and review of systems. ?This information was reviewed by me and noted.      Physical Examination     General: Well developed, well nourished, well hydrated. Verbal with a strong voice and not dysphonic.     Head/Face: Normocephalic, atraumatic. No scars or lesions. Facial musculature equal.     Eyes: No scleral icterus or conjunctival hemorrhage. EOMI. PERRLA.     Ears:     Right ear: No gross deformity. EAC is clear of debris and erythema. The TM is intact with a pneumatized middle ear. No signs of retraction, fluid or infection.      Left ear: No gross deformity. EAC is clear of debris and erythema. The TM is intact with a pneumatized middle ear. No signs of retraction, fluid or infection.       Neurologic: Moving all extremities without gross abnormality.CN II-XII grossly intact. House-Brackmann 1/6. No signs of nystagmus.      Psych: Alert and oriented to person, place, and time with an appropriate mood and affect.       Audiogram     Audiogram  was independently reviewed    Right mild Sensorineural Hearing Loss in the mid to high gz  Type A tymps AU  Excellent word rec AU      Improvement in right sided hearing, now mild to normal Sensorineural Hearing Loss AU        Imaging   MRI 2/19/24  FINDINGS: There is no restricted diffusion to suggest acute infarction.  No   focal encephalomalacia.  The brain parenchyma demonstrates no edema, mass, or   mass effect.  No significant white matter signal  hyperintensities.  No abnormal   enhancement.   The cerebellar tonsils are in expected location.  The visualized portions of   the sella appear within normal limits.   No intracranial hemorrhage or extra-axial fluid collections.  Stable size and   configuration of the ventricular system.  There is no shift of the midline.   Basal cisterns are patent.  There are preserved arterial flow-voids on T2   weighted imaging.  The paranasal sinuses and mastoid air cells are clear.  The   globes and orbits appear within normal limits.   Marrow signal is within normal limits.      Assessment     1. Tinnitus aurium, right    2. Sensorineural hearing loss (SNHL), bilateral          Plan:      Suspect right ear pain is related to underlying neurologic condition causing right-sided facial and neck pain  She has had recent MRI without evidence of IAC lesion    Will treat as a sudden Sensorineural Hearing Loss with high dose steroid taper     f/u 2 weeks with audio prior    Possibly atypical Meniere's as alternative dx      Update 3/25/24  Pressure may be related to poorly controlled migraine which causes facial pressure on that right side as well  Possible early meniere's (pressure worse sometimes after high salt meals or caffeine), low hz HL  We discussed trying a low salt diet, adjusting migraine medication, and f/u with Ecog if not improved or worsened in 1-2 months          Fredi Mcneill MD  [facility]   [facility] [address].  [place] [id number]  P: [phone]  F: [phone]

## 2024-03-25 NOTE — PATIENT INSTRUCTIONS
Diet for Meniere's Disease   The fluid-filled hearing and balance structures of the inner ear normally function independent of the bodys overall fluid/blood system. In a normal inner ear, the fluid is maintained at a constant volume and contains specific concentrations of sodium, potassium, chloride and other electrolytes. This fluid bathes the sensory cells of the inner ear and allows them to function normally.    With injury or degeneration of the inner ear structures the volume and concentration of the inner ear fluid fluctuates with changes in the bodys fluid/blood. This fluctuation causes the symptoms of hydrops--pressure or fullness in the ears, tinnitus (ringing in the ears), hearing loss, dizziness and imbalance.    When you eat foods that are high in salt or sugar, your blood level concentration of salt or sugar increases, and this, in turn, will affect the concentration of substances in your inner ear.    People with certain balance disorders must control the amount of salt and sugar that is added to food. You must also become aware of the hidden salts and sugars that foods contain. Limiting or eliminating your use of caffeine and alcohol will also help to reduce symptoms of dizziness and ringing in the ears.    The goal of treatment is to provide stable body fluid/blood levels so that secondary fluctuations in the inner ear fluid can be avoided.    Dietary Goals:  1. Distribute your food and fluid intake evenly throughout the day and from day to day. Eat approximately the same amount of food at each meal and do not skip meals. If you eat snacks, have them at regular times.  2. Avoid eating foods or fluids which have a high salt or sugar content. High salt or sugar levels in the diet result in fluctuations in the inner ear fluid pressure, and may increase your symptoms. Aim for a diet high in fresh fruits, vegetables and whole grains, and low in canned, frozen or processed foods. A 2-gram sodium intake  diet is usually what we recommend.  3. Drink adequate amounts of fluid daily. This should include water, milk and low-sugar fruit juices (example, cranberry or cran-apple). Coffee, tea, and soft drinks should not be counted as a part of this intake. Try to anticipate fluid loss which will occur with exercise or heat, and replace these fluids before they are lost.   4. Avoid caffeine-containing fluids and foods (such as coffee, tea and chocolate). Caffeine is a diuretic that causes excessive urinary loss of fluids. Caffeine also has stimulant properties that may make your symptoms worse.  5. Limit your alcohol intake to one glass of beer or wine each day. Alcohol can affect the inner ear directly, changing the volume and concentration if the inner ear fluid and increasing symptoms.  6. Avoid foods containing MSG (monosodium glutamate). This is often present in pre-packaged food products and in buffet-style food. It may increase symptoms in some patients.

## 2024-04-08 ENCOUNTER — OFFICE VISIT (OUTPATIENT)
Dept: OBSTETRICS AND GYNECOLOGY | Facility: CLINIC | Age: 34
End: 2024-04-08
Payer: COMMERCIAL

## 2024-04-08 VITALS
HEIGHT: 64 IN | SYSTOLIC BLOOD PRESSURE: 106 MMHG | DIASTOLIC BLOOD PRESSURE: 68 MMHG | BODY MASS INDEX: 24.2 KG/M2 | WEIGHT: 141.75 LBS

## 2024-04-08 DIAGNOSIS — Z12.4 CERVICAL CANCER SCREENING: ICD-10-CM

## 2024-04-08 DIAGNOSIS — Z01.419 WELL WOMAN EXAM WITH ROUTINE GYNECOLOGICAL EXAM: Primary | ICD-10-CM

## 2024-04-08 DIAGNOSIS — N94.3 PMS (PREMENSTRUAL SYNDROME): ICD-10-CM

## 2024-04-08 PROCEDURE — 3008F BODY MASS INDEX DOCD: CPT | Mod: CPTII,S$GLB,, | Performed by: OBSTETRICS & GYNECOLOGY

## 2024-04-08 PROCEDURE — 1160F RVW MEDS BY RX/DR IN RCRD: CPT | Mod: CPTII,S$GLB,, | Performed by: OBSTETRICS & GYNECOLOGY

## 2024-04-08 PROCEDURE — 1159F MED LIST DOCD IN RCRD: CPT | Mod: CPTII,S$GLB,, | Performed by: OBSTETRICS & GYNECOLOGY

## 2024-04-08 PROCEDURE — 87624 HPV HI-RISK TYP POOLED RSLT: CPT | Performed by: OBSTETRICS & GYNECOLOGY

## 2024-04-08 PROCEDURE — 3074F SYST BP LT 130 MM HG: CPT | Mod: CPTII,S$GLB,, | Performed by: OBSTETRICS & GYNECOLOGY

## 2024-04-08 PROCEDURE — 3078F DIAST BP <80 MM HG: CPT | Mod: CPTII,S$GLB,, | Performed by: OBSTETRICS & GYNECOLOGY

## 2024-04-08 PROCEDURE — 99385 PREV VISIT NEW AGE 18-39: CPT | Mod: S$GLB,,, | Performed by: OBSTETRICS & GYNECOLOGY

## 2024-04-08 PROCEDURE — 99999 PR PBB SHADOW E&M-EST. PATIENT-LVL III: CPT | Mod: PBBFAC,,, | Performed by: OBSTETRICS & GYNECOLOGY

## 2024-04-08 PROCEDURE — 88175 CYTOPATH C/V AUTO FLUID REDO: CPT | Performed by: OBSTETRICS & GYNECOLOGY

## 2024-04-08 RX ORDER — PROGESTERONE 200 MG/1
200 CAPSULE ORAL NIGHTLY
Qty: 14 CAPSULE | Refills: 11 | Status: SHIPPED | OUTPATIENT
Start: 2024-04-08 | End: 2025-04-08

## 2024-04-08 RX ORDER — VALACYCLOVIR HYDROCHLORIDE 500 MG/1
1000 TABLET, FILM COATED ORAL
COMMUNITY
Start: 2024-04-02

## 2024-04-08 NOTE — PROGRESS NOTES
Subjective     Patient ID: Brook Abraham is a 33 y.o. female.    Chief Complaint:  Well Woman      History of Present Illness  HPI  Presents for well-woman exam.  Pt was diagnosed with PCOS in the past, although her testosterone and DHEA levels were always really low.  Periods are monthly, but irregular.  She has had several neurological issues (constant vision changes, right optic neruitis, now migraines, and memory issues).  All problems exacerbate just prior to her periods.  Has had normal MRI brain, normal findings on lumbar punctures, and negative rheum work-up.  Pt is MM with her .  Had several SAB's.  He had a vasectomy last year.  She has no desire for future childbearing.  Last pap: 10/2021: normal  Pt's MGM had breast cancer, and mom had ovarian cancer; patient underwent genetic testing, which was negative    GYN & OB History  Patient's last menstrual period was 2024.   Date of Last Pap: No result found    OB History    Para Term  AB Living   5       5     SAB IAB Ectopic Multiple Live Births   4 1            # Outcome Date GA Lbr Jose/2nd Weight Sex Delivery Anes PTL Lv   5 SAB            4 SAB            3 SAB            2 SAB            1 IAB               Obstetric Comments   5 AB within the last 10 years       Review of Systems  Review of Systems       Objective   Physical Exam:   Constitutional: She is oriented to person, place, and time. She appears well-developed and well-nourished. No distress.      Neck: No thyromegaly present.     Pulmonary/Chest: Right breast exhibits no inverted nipple, no mass, no nipple discharge, no skin change, no tenderness, no bleeding and no swelling. Left breast exhibits no inverted nipple, no mass, no nipple discharge, no skin change, no tenderness, no bleeding and no swelling. Breasts are symmetrical.        Abdominal: Soft. She exhibits no distension and no mass. There is no abdominal tenderness. There is no rebound and no  guarding.     Genitourinary:    Pelvic exam was performed with patient supine.   There is no rash, tenderness, lesion or injury on the right labia. There is no rash, tenderness, lesion or injury on the left labia. Cervix is normal. Right adnexum displays no mass, no tenderness and no fullness. Left adnexum displays no mass, no tenderness and no fullness. No erythema, vaginal discharge, tenderness, bleeding, rectocele or cystocele in the vagina.    No foreign body in the vagina.      No signs of injury in the vagina.   Cervix exhibits no motion tenderness, no discharge and no friability. Uterus is not deviated, not enlarged, not fixed, not tender and not hosting fibroids.               Neurological: She is alert and oriented to person, place, and time.     Psychiatric: She has a normal mood and affect.            Assessment and Plan     1. Well woman exam with routine gynecological exam    2. Cervical cancer screening    3. PMS (premenstrual syndrome)             Plan:  Brook was seen today for well woman.    Diagnoses and all orders for this visit:    Well woman exam with routine gynecological exam    Cervical cancer screening  -     Liquid-Based Pap Smear, Screening  -     HPV High Risk Genotypes, PCR    PMS (premenstrual syndrome)  -     progesterone (PROMETRIUM) 200 MG capsule; Take 1 capsule (200 mg total) by mouth nightly. Start the night after ovulation     Reviewed that some of her neuro symptoms may be associated with drop in ovarian hormones just prior to menses.  Reviewed option for luteal phase progesterone supplementation vs depo provera.  Wants to try cyclic progesterone.  RTC 3 months.  If no improvement, can try depo provera.  Reviewed updated recommendations for pap smears (every 3 years) in low risk patients.   Recommend annual pelvic exams.  Reviewed recommendations for annual CBE.

## 2024-04-09 LAB — FUNGUS SPEC CULT: NORMAL

## 2024-04-12 ENCOUNTER — PATIENT MESSAGE (OUTPATIENT)
Dept: OBSTETRICS AND GYNECOLOGY | Facility: CLINIC | Age: 34
End: 2024-04-12
Payer: COMMERCIAL

## 2024-04-12 LAB
HPV HR 12 DNA SPEC QL NAA+PROBE: POSITIVE
HPV16 AG SPEC QL: NEGATIVE
HPV18 DNA SPEC QL NAA+PROBE: NEGATIVE

## 2024-04-16 LAB
FINAL PATHOLOGIC DIAGNOSIS: NORMAL
Lab: NORMAL

## 2024-04-23 LAB
ACID FAST MOD KINY STN SPEC: NORMAL
MYCOBACTERIUM SPEC QL CULT: NORMAL

## 2024-05-01 ENCOUNTER — PATIENT MESSAGE (OUTPATIENT)
Dept: OBSTETRICS AND GYNECOLOGY | Facility: CLINIC | Age: 34
End: 2024-05-01
Payer: COMMERCIAL

## 2024-05-01 ENCOUNTER — PATIENT MESSAGE (OUTPATIENT)
Dept: INFECTIOUS DISEASES | Facility: CLINIC | Age: 34
End: 2024-05-01
Payer: COMMERCIAL

## 2024-05-08 ENCOUNTER — PATIENT MESSAGE (OUTPATIENT)
Dept: OTOLARYNGOLOGY | Facility: CLINIC | Age: 34
End: 2024-05-08
Payer: COMMERCIAL

## 2024-07-08 ENCOUNTER — OFFICE VISIT (OUTPATIENT)
Dept: OBSTETRICS AND GYNECOLOGY | Facility: CLINIC | Age: 34
End: 2024-07-08
Payer: COMMERCIAL

## 2024-07-08 VITALS
WEIGHT: 144.38 LBS | DIASTOLIC BLOOD PRESSURE: 62 MMHG | BODY MASS INDEX: 24.65 KG/M2 | SYSTOLIC BLOOD PRESSURE: 102 MMHG | HEIGHT: 64 IN

## 2024-07-08 DIAGNOSIS — N94.3 PMS (PREMENSTRUAL SYNDROME): Primary | ICD-10-CM

## 2024-07-08 DIAGNOSIS — Z30.42 ENCOUNTER FOR DEPO-PROVERA CONTRACEPTION: ICD-10-CM

## 2024-07-08 LAB
B-HCG UR QL: NEGATIVE
CTP QC/QA: YES

## 2024-07-08 PROCEDURE — 99999 PR PBB SHADOW E&M-EST. PATIENT-LVL III: CPT | Mod: PBBFAC,,, | Performed by: OBSTETRICS & GYNECOLOGY

## 2024-07-08 PROCEDURE — 81025 URINE PREGNANCY TEST: CPT | Mod: S$GLB,,, | Performed by: OBSTETRICS & GYNECOLOGY

## 2024-07-08 PROCEDURE — 3008F BODY MASS INDEX DOCD: CPT | Mod: CPTII,S$GLB,, | Performed by: OBSTETRICS & GYNECOLOGY

## 2024-07-08 PROCEDURE — 3078F DIAST BP <80 MM HG: CPT | Mod: CPTII,S$GLB,, | Performed by: OBSTETRICS & GYNECOLOGY

## 2024-07-08 PROCEDURE — 99213 OFFICE O/P EST LOW 20 MIN: CPT | Mod: S$GLB,,, | Performed by: OBSTETRICS & GYNECOLOGY

## 2024-07-08 PROCEDURE — 1159F MED LIST DOCD IN RCRD: CPT | Mod: CPTII,S$GLB,, | Performed by: OBSTETRICS & GYNECOLOGY

## 2024-07-08 PROCEDURE — 3074F SYST BP LT 130 MM HG: CPT | Mod: CPTII,S$GLB,, | Performed by: OBSTETRICS & GYNECOLOGY

## 2024-07-08 RX ORDER — MEDROXYPROGESTERONE ACETATE 150 MG/ML
150 INJECTION, SUSPENSION INTRAMUSCULAR
Status: ACTIVE | OUTPATIENT
Start: 2024-07-08 | End: 2025-07-03

## 2024-07-08 RX ADMIN — MEDROXYPROGESTERONE ACETATE 150 MG: 150 INJECTION, SUSPENSION INTRAMUSCULAR at 10:07

## 2024-07-08 NOTE — PROGRESS NOTES
Verified patient with 2 patient identifiers. Allergies and medications reviewed.   Depo Provera 150mg/ml given IM to left deltoid using aseptic technique.   No discomfort noted. Patient tolerated well.     Next injection scheduled.    Patient advised to wait 15 minutes in lobby to monitor for reaction.   Patient verbalized understanding.

## 2024-07-08 NOTE — PROGRESS NOTES
"Subjective     Patient ID: Brook Abraham is a 33 y.o. female.    Chief Complaint:  Follow-up      History of Present Illness  Follow-up      Presents to f/u response to cyclic prometrium.  To recap from her last visit:  " Pt was diagnosed with PCOS in the past, although her testosterone and DHEA levels were always really low.  Periods are monthly, but irregular.  She has had several neurological issues (constant vision changes, right optic neruitis, now migraines, and memory issues).  All problems exacerbate just prior to her periods.  Has had normal MRI brain, normal findings on lumbar punctures, and negative rheum work-up.  Pt is MM with her .  Had several SAB's.  He had a vasectomy last year.  She has no desire for future childbearing.  Last pap: 10/2021: normal  Pt's MGM had breast cancer, and mom had ovarian cancer; patient underwent genetic testing, which was negative"    We discussed cyclic prometrium vs depo provera.  Pt opted to try cyclic prometrium.  When on it, she states she feels great.  The weeks off it, all her symptoms return.  Also, her first period was very heavy, and second period was lighter, but lasted longer.  Pt is ready to try depo provera.    GYN & OB History  Patient's last menstrual period was 2024 (exact date).   Date of Last Pap: 2024    OB History    Para Term  AB Living   5       5     SAB IAB Ectopic Multiple Live Births   4 1            # Outcome Date GA Lbr Jose/2nd Weight Sex Type Anes PTL Lv   5 SAB            4 SAB            3 SAB            2 SAB            1 IAB               Obstetric Comments   5 AB within the last 10 years       Review of Systems  Review of Systems       Objective   Physical Exam:   Constitutional: She is oriented to person, place, and time. She appears well-developed and well-nourished. No distress.                           Neurological: She is alert and oriented to person, place, and time.     Psychiatric: She " has a normal mood and affect. Her behavior is normal. Judgment and thought content normal.            Assessment and Plan     1. PMS (premenstrual syndrome)             Plan:  Brook was seen today for follow-up.    Diagnoses and all orders for this visit:    PMS (premenstrual syndrome)  -     medroxyPROGESTERone (DEPO-PROVERA) syringe 150 mg     Switch to depo provera.  RTC 6 months to f/u symptoms.

## 2024-07-23 ENCOUNTER — PATIENT MESSAGE (OUTPATIENT)
Dept: INFECTIOUS DISEASES | Facility: CLINIC | Age: 34
End: 2024-07-23
Payer: COMMERCIAL

## 2024-07-24 ENCOUNTER — TELEPHONE (OUTPATIENT)
Dept: INFECTIOUS DISEASES | Facility: CLINIC | Age: 34
End: 2024-07-24
Payer: COMMERCIAL

## 2024-07-24 DIAGNOSIS — R89.4 IMMUNOLOGIC ABNORMALITY: Primary | ICD-10-CM

## 2024-07-31 ENCOUNTER — LAB VISIT (OUTPATIENT)
Dept: LAB | Facility: HOSPITAL | Age: 34
End: 2024-07-31
Attending: STUDENT IN AN ORGANIZED HEALTH CARE EDUCATION/TRAINING PROGRAM
Payer: COMMERCIAL

## 2024-07-31 ENCOUNTER — OFFICE VISIT (OUTPATIENT)
Dept: ALLERGY | Facility: CLINIC | Age: 34
End: 2024-07-31
Payer: COMMERCIAL

## 2024-07-31 VITALS
TEMPERATURE: 98 F | BODY MASS INDEX: 25.03 KG/M2 | HEIGHT: 64 IN | DIASTOLIC BLOOD PRESSURE: 77 MMHG | OXYGEN SATURATION: 99 % | HEART RATE: 64 BPM | SYSTOLIC BLOOD PRESSURE: 113 MMHG | WEIGHT: 146.63 LBS

## 2024-07-31 DIAGNOSIS — T50.905A ADVERSE EFFECT OF DRUG, INITIAL ENCOUNTER: ICD-10-CM

## 2024-07-31 DIAGNOSIS — G90.A POTS (POSTURAL ORTHOSTATIC TACHYCARDIA SYNDROME): ICD-10-CM

## 2024-07-31 DIAGNOSIS — K13.79 RECURRENT ORAL ULCERS: ICD-10-CM

## 2024-07-31 DIAGNOSIS — B99.9 RECURRENT INFECTIONS: Primary | ICD-10-CM

## 2024-07-31 DIAGNOSIS — B99.9 RECURRENT INFECTIONS: ICD-10-CM

## 2024-07-31 DIAGNOSIS — H16.009 CORNEAL ULCER, UNSPECIFIED LATERALITY: ICD-10-CM

## 2024-07-31 LAB
BASOPHILS # BLD AUTO: 0.04 K/UL (ref 0–0.2)
BASOPHILS NFR BLD: 0.5 % (ref 0–1.9)
C3 SERPL-MCNC: 112 MG/DL (ref 50–180)
C4 SERPL-MCNC: 26 MG/DL (ref 11–44)
CCP AB SER IA-ACNC: <0.5 U/ML
DIFFERENTIAL METHOD BLD: NORMAL
EOSINOPHIL # BLD AUTO: 0.1 K/UL (ref 0–0.5)
EOSINOPHIL NFR BLD: 1.1 % (ref 0–8)
ERYTHROCYTE [DISTWIDTH] IN BLOOD BY AUTOMATED COUNT: 12.2 % (ref 11.5–14.5)
HCT VFR BLD AUTO: 44.3 % (ref 37–48.5)
HGB BLD-MCNC: 14.4 G/DL (ref 12–16)
HIV 1+2 AB+HIV1 P24 AG SERPL QL IA: NORMAL
IGA SERPL-MCNC: 175 MG/DL (ref 40–350)
IGE SERPL-ACNC: <35 IU/ML (ref 0–100)
IGG SERPL-MCNC: 850 MG/DL (ref 650–1600)
IGM SERPL-MCNC: 152 MG/DL (ref 50–300)
IMM GRANULOCYTES # BLD AUTO: 0.02 K/UL (ref 0–0.04)
IMM GRANULOCYTES NFR BLD AUTO: 0.3 % (ref 0–0.5)
LYMPHOCYTES # BLD AUTO: 2.2 K/UL (ref 1–4.8)
LYMPHOCYTES NFR BLD: 29.4 % (ref 18–48)
MCH RBC QN AUTO: 30.1 PG (ref 27–31)
MCHC RBC AUTO-ENTMCNC: 32.5 G/DL (ref 32–36)
MCV RBC AUTO: 93 FL (ref 82–98)
MONOCYTES # BLD AUTO: 0.7 K/UL (ref 0.3–1)
MONOCYTES NFR BLD: 9.5 % (ref 4–15)
NEUTROPHILS # BLD AUTO: 4.5 K/UL (ref 1.8–7.7)
NEUTROPHILS NFR BLD: 59.2 % (ref 38–73)
NRBC BLD-RTO: 0 /100 WBC
PLATELET # BLD AUTO: 250 K/UL (ref 150–450)
PMV BLD AUTO: 11.5 FL (ref 9.2–12.9)
RBC # BLD AUTO: 4.79 M/UL (ref 4–5.4)
RHEUMATOID FACT SERPL-ACNC: <13 IU/ML (ref 0–15)
WBC # BLD AUTO: 7.61 K/UL (ref 3.9–12.7)

## 2024-07-31 PROCEDURE — 90471 IMMUNIZATION ADMIN: CPT | Mod: S$GLB,,, | Performed by: STUDENT IN AN ORGANIZED HEALTH CARE EDUCATION/TRAINING PROGRAM

## 2024-07-31 PROCEDURE — 86161 COMPLEMENT/FUNCTION ACTIVITY: CPT | Performed by: STUDENT IN AN ORGANIZED HEALTH CARE EDUCATION/TRAINING PROGRAM

## 2024-07-31 PROCEDURE — 86353 LYMPHOCYTE TRANSFORMATION: CPT | Mod: 91 | Performed by: STUDENT IN AN ORGANIZED HEALTH CARE EDUCATION/TRAINING PROGRAM

## 2024-07-31 PROCEDURE — 86162 COMPLEMENT TOTAL (CH50): CPT | Performed by: STUDENT IN AN ORGANIZED HEALTH CARE EDUCATION/TRAINING PROGRAM

## 2024-07-31 PROCEDURE — 82784 ASSAY IGA/IGD/IGG/IGM EACH: CPT | Performed by: STUDENT IN AN ORGANIZED HEALTH CARE EDUCATION/TRAINING PROGRAM

## 2024-07-31 PROCEDURE — 1160F RVW MEDS BY RX/DR IN RCRD: CPT | Mod: CPTII,S$GLB,, | Performed by: STUDENT IN AN ORGANIZED HEALTH CARE EDUCATION/TRAINING PROGRAM

## 2024-07-31 PROCEDURE — 86317 IMMUNOASSAY INFECTIOUS AGENT: CPT | Performed by: STUDENT IN AN ORGANIZED HEALTH CARE EDUCATION/TRAINING PROGRAM

## 2024-07-31 PROCEDURE — 3078F DIAST BP <80 MM HG: CPT | Mod: CPTII,S$GLB,, | Performed by: STUDENT IN AN ORGANIZED HEALTH CARE EDUCATION/TRAINING PROGRAM

## 2024-07-31 PROCEDURE — 82784 ASSAY IGA/IGD/IGG/IGM EACH: CPT | Mod: 59 | Performed by: STUDENT IN AN ORGANIZED HEALTH CARE EDUCATION/TRAINING PROGRAM

## 2024-07-31 PROCEDURE — 87389 HIV-1 AG W/HIV-1&-2 AB AG IA: CPT | Performed by: STUDENT IN AN ORGANIZED HEALTH CARE EDUCATION/TRAINING PROGRAM

## 2024-07-31 PROCEDURE — 3008F BODY MASS INDEX DOCD: CPT | Mod: CPTII,S$GLB,, | Performed by: STUDENT IN AN ORGANIZED HEALTH CARE EDUCATION/TRAINING PROGRAM

## 2024-07-31 PROCEDURE — 86355 B CELLS TOTAL COUNT: CPT | Performed by: STUDENT IN AN ORGANIZED HEALTH CARE EDUCATION/TRAINING PROGRAM

## 2024-07-31 PROCEDURE — 99999 PR PBB SHADOW E&M-EST. PATIENT-LVL IV: CPT | Mod: PBBFAC,,, | Performed by: STUDENT IN AN ORGANIZED HEALTH CARE EDUCATION/TRAINING PROGRAM

## 2024-07-31 PROCEDURE — 86431 RHEUMATOID FACTOR QUANT: CPT | Performed by: STUDENT IN AN ORGANIZED HEALTH CARE EDUCATION/TRAINING PROGRAM

## 2024-07-31 PROCEDURE — 86317 IMMUNOASSAY INFECTIOUS AGENT: CPT | Mod: 59 | Performed by: STUDENT IN AN ORGANIZED HEALTH CARE EDUCATION/TRAINING PROGRAM

## 2024-07-31 PROCEDURE — 86665 EPSTEIN-BARR CAPSID VCA: CPT | Performed by: STUDENT IN AN ORGANIZED HEALTH CARE EDUCATION/TRAINING PROGRAM

## 2024-07-31 PROCEDURE — 83520 IMMUNOASSAY QUANT NOS NONAB: CPT | Performed by: STUDENT IN AN ORGANIZED HEALTH CARE EDUCATION/TRAINING PROGRAM

## 2024-07-31 PROCEDURE — 86663 EPSTEIN-BARR ANTIBODY: CPT | Performed by: STUDENT IN AN ORGANIZED HEALTH CARE EDUCATION/TRAINING PROGRAM

## 2024-07-31 PROCEDURE — 82785 ASSAY OF IGE: CPT | Performed by: STUDENT IN AN ORGANIZED HEALTH CARE EDUCATION/TRAINING PROGRAM

## 2024-07-31 PROCEDURE — 86360 T CELL ABSOLUTE COUNT/RATIO: CPT | Performed by: STUDENT IN AN ORGANIZED HEALTH CARE EDUCATION/TRAINING PROGRAM

## 2024-07-31 PROCEDURE — 86160 COMPLEMENT ANTIGEN: CPT | Mod: 59 | Performed by: STUDENT IN AN ORGANIZED HEALTH CARE EDUCATION/TRAINING PROGRAM

## 2024-07-31 PROCEDURE — 86644 CMV ANTIBODY: CPT | Performed by: STUDENT IN AN ORGANIZED HEALTH CARE EDUCATION/TRAINING PROGRAM

## 2024-07-31 PROCEDURE — 99205 OFFICE O/P NEW HI 60 MIN: CPT | Mod: 25,S$GLB,, | Performed by: STUDENT IN AN ORGANIZED HEALTH CARE EDUCATION/TRAINING PROGRAM

## 2024-07-31 PROCEDURE — 86160 COMPLEMENT ANTIGEN: CPT | Performed by: STUDENT IN AN ORGANIZED HEALTH CARE EDUCATION/TRAINING PROGRAM

## 2024-07-31 PROCEDURE — 86695 HERPES SIMPLEX TYPE 1 TEST: CPT | Performed by: STUDENT IN AN ORGANIZED HEALTH CARE EDUCATION/TRAINING PROGRAM

## 2024-07-31 PROCEDURE — 3074F SYST BP LT 130 MM HG: CPT | Mod: CPTII,S$GLB,, | Performed by: STUDENT IN AN ORGANIZED HEALTH CARE EDUCATION/TRAINING PROGRAM

## 2024-07-31 PROCEDURE — 86747 PARVOVIRUS ANTIBODY: CPT | Mod: 91 | Performed by: STUDENT IN AN ORGANIZED HEALTH CARE EDUCATION/TRAINING PROGRAM

## 2024-07-31 PROCEDURE — 86235 NUCLEAR ANTIGEN ANTIBODY: CPT | Performed by: STUDENT IN AN ORGANIZED HEALTH CARE EDUCATION/TRAINING PROGRAM

## 2024-07-31 PROCEDURE — 36415 COLL VENOUS BLD VENIPUNCTURE: CPT | Performed by: STUDENT IN AN ORGANIZED HEALTH CARE EDUCATION/TRAINING PROGRAM

## 2024-07-31 PROCEDURE — 86200 CCP ANTIBODY: CPT | Performed by: STUDENT IN AN ORGANIZED HEALTH CARE EDUCATION/TRAINING PROGRAM

## 2024-07-31 PROCEDURE — 86353 LYMPHOCYTE TRANSFORMATION: CPT | Performed by: STUDENT IN AN ORGANIZED HEALTH CARE EDUCATION/TRAINING PROGRAM

## 2024-07-31 PROCEDURE — 1159F MED LIST DOCD IN RCRD: CPT | Mod: CPTII,S$GLB,, | Performed by: STUDENT IN AN ORGANIZED HEALTH CARE EDUCATION/TRAINING PROGRAM

## 2024-07-31 PROCEDURE — 86684 HEMOPHILUS INFLUENZA ANTIBDY: CPT | Performed by: STUDENT IN AN ORGANIZED HEALTH CARE EDUCATION/TRAINING PROGRAM

## 2024-07-31 PROCEDURE — 90732 PPSV23 VACC 2 YRS+ SUBQ/IM: CPT | Mod: S$GLB,,, | Performed by: STUDENT IN AN ORGANIZED HEALTH CARE EDUCATION/TRAINING PROGRAM

## 2024-07-31 PROCEDURE — 86038 ANTINUCLEAR ANTIBODIES: CPT | Performed by: STUDENT IN AN ORGANIZED HEALTH CARE EDUCATION/TRAINING PROGRAM

## 2024-07-31 PROCEDURE — 86696 HERPES SIMPLEX TYPE 2 TEST: CPT | Performed by: STUDENT IN AN ORGANIZED HEALTH CARE EDUCATION/TRAINING PROGRAM

## 2024-07-31 PROCEDURE — 85025 COMPLETE CBC W/AUTO DIFF WBC: CPT | Performed by: STUDENT IN AN ORGANIZED HEALTH CARE EDUCATION/TRAINING PROGRAM

## 2024-07-31 NOTE — ASSESSMENT & PLAN NOTE
- Patient reported being evaluated for POTS   - Ordering tryptase at this time to screen for mast cell disease   - Patient was unaware of mast cell diseases prior to this appointment  - No family hx of mast cell disease noted

## 2024-07-31 NOTE — PROGRESS NOTES
Allergy and Immunology  New Patient Clinic Note    Date: 7/31/2024  Chief Complaint   Patient presents with    Immunotherapy     Referred by: Kendrick Kenny DO  [address] [place] [address]    History  Brook Abraham is a 33 y.o. female being seen as a New Patient today.    Recurrent Infections  - Recurrent Sinus Infections:   - Patient reported recurrent sinus infection since adolescents   - Unclear if bacterial v viral URI but immune evaluation and vaccination at this time  - Will assess humoral function as well as splenic marginal B cell function given EBV   - Patient does not clinically appear to be asplenic/functionally asplenic      - HSV/EBV/VZV/HHV7:  - Serum IgG testing indicated prior infections   - Concern for oral ulcer and ocular ulcers  - Need to consider rheumatologic v infectious causes  - Patient reported painful oral ulcer inside mouth NOT on lips   - Painful ulcers inconsistent with lupus - possible HSV or Behcet's   - Denied painful genital ulcers   - No hx of seizures or aseptic meningitis noted   - Neurologic evaluation with ocular and peripheral neurologic complaints   - Does not appear to have atypical viral presentation or condylomas   - NK and CD8 cells to be evaluation     - Other:   - IgG positive for Salmonella infection in the past   - No memory of circumstances linked to this infectious exposure     POTS?  - Concern for POTS and ordering tryptase to rule out mast cell disease   - No hx of anaphylaxis to Hymenoptera flying or non-flying insects   - No hx of urticaria or unknown cause of anaphylaxis     Rhinitis   - No hx of rhinitis     Chronic or Inducible Urticaria  - No hx of chronic urticaria     Asthma   - No hx of asthma     CRSwNP  - No hx of CRSwNP     Eczema   - No hx of eczema     Eosinophilic Esophagitis  - No hx of eosinophilic esophagitis     Food Allergy  - No hx of food allergy  - Patient is lactose intolerant but not milk protein  allergic     Drug Allergy  - Bactrim: Age 21 years   - SOB, sensation of throat closure and collapse    - Occurred 5-10 minutes after ingestion of 1st dose    - High-risk hx and no clinical indication for Bactrim at this time    - Recommended skin testing if needed versus oral desensitization for temporary tolerance   - Opioids: Late teens - reported SOB, sensation of throat closure and collapse    - Reported similar sensation when taking Bactrim    - Specifically occurred with IV Morphine for ruptured ovarian cyst    - Patient has tolerated Codeine since reported Morphine reaction     Venom Allergy  - No hx of venom allergy     Allergies, PMH, PSH, Social, and Family History were reviewed.    Review of patient's allergies indicates:   Allergen Reactions    Opioids - morphine analogues Anaphylaxis    Sulfa (sulfonamide antibiotics) Anaphylaxis, Hives and Shortness Of Breath      Past Medical History:   Diagnosis Date    PCOS (polycystic ovarian syndrome)      Past Surgical History:   Procedure Laterality Date    DIAGNOSTIC LAPAROSCOPY      spinal tap       Social History     Social History Narrative    Not on file     S/he reports that she has quit smoking. Her smoking use included cigarettes. She has never used smokeless tobacco. She reports current alcohol use. She reports that she does not use drugs.    Current Outpatient Medications on File Prior to Visit   Medication Sig Dispense Refill    valACYclovir (VALTREX) 500 MG tablet Take 1,000 mg by mouth.       Current Facility-Administered Medications on File Prior to Visit   Medication Dose Route Frequency Provider Last Rate Last Admin    medroxyPROGESTERone (DEPO-PROVERA) syringe 150 mg  150 mg Intramuscular Q90 Days    150 mg at 07/08/24 1028       Physical Examination  Vitals:    07/31/24 0704   BP: 113/77   Pulse: 64   Temp: 98.1 °F (36.7 °C)     GENERAL:  female in no apparent distress and well developed and well nourished  HEAD:  Normocephalic, without  obvious abnormality, atraumatic  EYES: sclera anicteric, conjunctiva normochromic  EARS: normal TM's and external ear canals both ears  NOSE: without erythema or discharge, clear discharge, turbinates normal    OROPHARYNX: moist mucous membranes without erythema, exudates or petechiae  LYMPH NODES: normal, supple, no lymphadenopathy  LUNGS: clear to auscultation, no wheezes, rales or rhonchi, symmetric air entry.  HEART: normal rate, regular rhythm, normal S1, S2, no murmurs, rubs, clicks or gallops.  ABDOMEN: soft, nontender, nondistended, no masses or organomegaly.  MUSCULOSKELETAL: no gross joint deformity or swelling.  NEURO: alert, oriented, normal speech, no focal findings or movement disorder noted.  SKIN: normal coloration and turgor, no rashes, no suspicious skin lesions noted.     Assessment/Plan:   Problem List Items Addressed This Visit          Ophtho    Corneal ulcer    Current Assessment & Plan     - Possible HSV v autoimmune   - Evaluation at this time             ENT    Recurrent oral ulcers    Relevant Orders    TAVO Screen w/Reflex    Complement, Total    RHEUMATOID FACTOR    CYCLIC CITRUL PEPTIDE ANTIBODY, IGG    C3 COMPLEMENT    C4 COMPLEMENT    ANTI- ERICKA-1 ANTIBODY    HLA B27 ANTIGEN       Cardiac/Vascular    POTS (postural orthostatic tachycardia syndrome)    Current Assessment & Plan     - Patient reported being evaluated for POTS   - Ordering tryptase at this time to screen for mast cell disease   - Patient was unaware of mast cell diseases prior to this appointment  - No family hx of mast cell disease noted          Relevant Orders    Tryptase       ID    Recurrent infections - Primary    Overview     - Recurrent Sinus Infections:   - Patient reported recurrent sinus infection since adolescents   - Unclear if bacterial v viral URI but immune evaluation and vaccination at this time  - Will assess humoral function as well as splenic marginal B cell function given EBV   - Patient does not  clinically appear to be asplenic/functionally asplenic      - HSV/EBV/VZV/HHV7:  - Serum IgG testing indicated prior infections   - Concern for oral ulcer and ocular ulcers  - Need to consider rheumatologic v infectious causes  - Patient reported painful oral ulcer inside mouth NOT on lips   - Painful ulcers inconsistent with lupus - possible HSV or bechet's   - Denied painful genital ulcers   - No hx of seizures or aseptic meningitis noted   - Neurologic evaluation with ocular and peripheral neurologic complaints   - Does not appear to have atypical viral presentation or condylomas   - NK and CD8 cells to be evaluation     - Other:   - IgG positive for Salmonella infection in the past   - No memory of circumstances linked to this infectious exposure          Current Assessment & Plan     - Administered PPSV23   - Robust immunodeficiency evaluation at this time   - Need to additionally evaluate patient for autoimmune component         Relevant Orders    TAVO Screen w/Reflex    Complement, Total    Complement, Alternate Pathway (AH50)    RHEUMATOID FACTOR    CYCLIC CITRUL PEPTIDE ANTIBODY, IGG    C3 COMPLEMENT    C4 COMPLEMENT    ANTI- ERICKA-1 ANTIBODY    HLA B27 ANTIGEN    IMTIAZ-BARR VIRUS ANTIBODY PANEL    HERPES SIMPLEX 1&2 IGG    Cytomegalovirus Antibody, IgG    PARVOVIRUS B19 ANTIBODY, IGG AND IGM    HIV 1/2 Ag/Ab (4th Gen)    CBC Auto Differential    Streptococcus Pneumoniae IgG Antibody (23 Serotypes), MAID    IgE    IgG    IgM    IgA    Lynphocyte Subset Panel 7 - Congenital Immunodeficiencies    LYMPHOCYTE PROLIFERATION, MITOGENS    LYMPHOCYTE PROLIFERATION ANTIGENS    Tetanus Toxoid, IgG    HAEMOPHILIUS INFLUENZAE B AB IGG       Other    Drug reaction    Overview     - Bactrim: Age 21 years   - SOB, sensation of throat closure and collapse    - Occurred 5-10 minutes after ingestion of 1st dose    - High-risk hx and no clinical indication for Bactrim at this time    - Recommended skin testing if needed versus  oral desensitization for temporary tolerance   - Opioids: Late teens - reported SOB, sensation of throat closure and collapse    - Reported similar sensation when taking Bactrim    - Specifically occurred with IV Morphine for ruptured ovarian cyst    - Patient has tolerated Codeine since reported Morphine reaction           Follow up:  Follow up in about 6 weeks (around 9/11/2024).    Kesler Bourgoyne, MD [facility]  Allergy and Immunology

## 2024-07-31 NOTE — ASSESSMENT & PLAN NOTE
- Administered PPSV23   - Robust immunodeficiency evaluation at this time   - Need to additionally evaluate patient for autoimmune component

## 2024-08-01 LAB
ANA SER QL IF: NORMAL
EBV EA IGG SER QL IA: POSITIVE
EBV VCA IGG SER QL IA: POSITIVE
EBV VCA IGM SER QL IA: NEGATIVE
EPSTEIN-BARR VIRUS IGG, EARLY ANTIGEN: POSITIVE
HSV1 IGG SERPL QL IA: POSITIVE
HSV2 IGG SERPL QL IA: NEGATIVE

## 2024-08-02 LAB
ANNOTATION COMMENT IMP: NORMAL
CD19 CELLS NFR SPEC: 14 % (ref 6–23)
CD2 CELLS # BLD: 1992 CELLS/UL (ref 700–2600)
CD2 CELLS NFR BLD: 83 % (ref 73–91)
CD3 CELLS # BLD: 1743 CELLS/UL (ref 570–2400)
CD3 CELLS NFR SPEC: 77 % (ref 62–87)
CD3+CD4+ CELLS # BLD: 1104 CELLS/UL (ref 430–1800)
CD3+CD4+ CELLS NFR BLD: 49 % (ref 32–64)
CD3+CD4+ CELLS/CD3+CD8+ CLL BLD: 1.88 RATIO (ref 0.8–3.9)
CD3+CD8+ CELLS # BLD: 580 CELLS/UL (ref 210–1200)
CD3+CD8+ CELLS NFR SPEC: 26 % (ref 15–46)
CD3-CD16+CD56+ CELLS # SPEC: 178 CELLS/UL (ref 78–470)
CD3-CD16+CD56+ CELLS NFR SPEC: 8 % (ref 4–26)
CD4+CD45RA+ CELLS # BLD: 352 CELLS/UL (ref 150–870)
CD4+CD45RO+ CELLS # BLD: 688 CELLS/UL (ref 190–1050)
CD4+CD45RO+ CELLS NFR BLD: 66 % (ref 28–72)
CD45RA CELLS NFR BLD: 34 % (ref 28–71)
CELLS.CD3-CD19+ [#/VOLUME] IN BLOOD: 316 CELLS/UL (ref 91–610)
CMV IGG SERPL QL IA: REACTIVE
ENA JO1 IGG SER-ACNC: <0.2 U
HLA-DR+ CELLS # BLD: 332 CELLS/UL (ref 100–640)
HLA-DR+ CELLS NFR BLD: 14 % (ref 8–24)
TRYPTASE LEVEL: 3.5 NG/ML

## 2024-08-03 LAB
TETANUS TOXOID IGG AB: POSITIVE
TETANUS TOXOID IGG VALUE: 1.38 IU/ML

## 2024-08-05 LAB
AH50 ACT/NOR SER IA: <10 %OF NORM
ANNOTATION COMMENT IMP: NORMAL
FLOW CYTOMETRY SPECIALIST REVIEW: NORMAL
HAEM INFLU B IGG SER IA-MCNC: 7.08 MG/L
LPT OKT3 BLD-NRATE: 87 %
LPT PHA MAX/CD45 NFR BLD FC: 84 %
LPT PW MAX/CD19 NFR BLD FC: 27.3 %
LPT PW/CD3 NFR BLD FC: 16.9 %
LPT PW/CD45 NFR BLD FC: 17.2 %
PARVOVIRUS B19 ABS IGG & IGM: ABNORMAL
PARVOVIRUS B19 IGG ANTIBODY: POSITIVE
PARVOVIRUS B19 IGM ANTIBODY: NEGATIVE
VIAB OF LYMPHS AT DAY 0: 85.8 %

## 2024-08-06 LAB — CH50 SERPL-ACNC: >97 U/ML (ref 42–95)

## 2024-08-07 LAB
IMMUNOLOGIST REVIEW: NORMAL
S PN DA SERO 19F IGG SER-MCNC: 2 MCG/ML
S PNEUM DA 1 IGG SER-MCNC: 0.3 MCG/ML
S PNEUM DA 10A IGG SER-MCNC: 0.5 MCG/ML
S PNEUM DA 11A IGG SER-MCNC: 0.2 MCG/ML
S PNEUM DA 12F IGG SER-MCNC: 0.1 MCG/ML
S PNEUM DA 14 IGG SER-MCNC: 0.3 MCG/ML
S PNEUM DA 15B IGG SER-MCNC: 1 MCG/ML
S PNEUM DA 17F IGG SER-MCNC: 0.5 MCG/ML
S PNEUM DA 18C IGG SER-MCNC: 0.1 MCG/ML
S PNEUM DA 19A IGG SER-MCNC: 1.5 MCG/ML
S PNEUM DA 2 IGG SER-MCNC: 0.6 MCG/ML
S PNEUM DA 20A IGG SER-MCNC: 1.7 MCG/ML
S PNEUM DA 22F IGG SER-MCNC: 0.7 MCG/ML
S PNEUM DA 23F IGG SER-MCNC: 0.3 MCG/ML
S PNEUM DA 3 IGG SER-MCNC: 0.1 MCG/ML
S PNEUM DA 33F IGG SER-MCNC: 1.3 MCG/ML
S PNEUM DA 4 IGG SER-MCNC: 0.3 MCG/ML
S PNEUM DA 5 IGG SER-MCNC: 0.1 MCG/ML
S PNEUM DA 6B IGG SER-MCNC: 0.3 MCG/ML
S PNEUM DA 7F IGG SER-MCNC: 0.5 MCG/ML
S PNEUM DA 8 IGG SER-MCNC: 2 MCG/ML
S PNEUM DA 9N IGG SER-MCNC: 0.4 MCG/ML
S PNEUM DA 9V IGG SER-MCNC: 0.1 MCG/ML

## 2024-08-09 LAB
ANNOTATION COMMENT IMP: ABNORMAL
FLOW CYTOMETRY SPECIALIST REVIEW: ABNORMAL
LPT CA MAX/CD3 BLD FC-NFR: 21 %
LPT CA MAX/CD45 BLD FC-NFR: 17.7 %
LPT TT MAX/CD3 BLD FC-NFR: 1.6 %
LPT TT MAX/CD45 BLD FC-NFR: 1.7 %
VIAB OF LYMPHS DAY 0: 85.8 %

## 2024-08-21 ENCOUNTER — PATIENT MESSAGE (OUTPATIENT)
Dept: OBSTETRICS AND GYNECOLOGY | Facility: CLINIC | Age: 34
End: 2024-08-21
Payer: COMMERCIAL

## 2024-08-28 ENCOUNTER — PATIENT MESSAGE (OUTPATIENT)
Dept: OTOLARYNGOLOGY | Facility: CLINIC | Age: 34
End: 2024-08-28
Payer: COMMERCIAL

## 2024-08-29 ENCOUNTER — OFFICE VISIT (OUTPATIENT)
Dept: OTOLARYNGOLOGY | Facility: CLINIC | Age: 34
End: 2024-08-29
Payer: COMMERCIAL

## 2024-08-29 VITALS — HEIGHT: 66 IN | BODY MASS INDEX: 23.74 KG/M2 | WEIGHT: 147.69 LBS

## 2024-08-29 DIAGNOSIS — R42 DIZZINESS: ICD-10-CM

## 2024-08-29 DIAGNOSIS — G43.909 MIGRAINE WITHOUT STATUS MIGRAINOSUS, NOT INTRACTABLE, UNSPECIFIED MIGRAINE TYPE: ICD-10-CM

## 2024-08-29 DIAGNOSIS — H93.8X1 PRESSURE SENSATION IN RIGHT EAR: Primary | ICD-10-CM

## 2024-08-29 DIAGNOSIS — H90.3 SENSORINEURAL HEARING LOSS (SNHL), BILATERAL: ICD-10-CM

## 2024-08-29 PROCEDURE — 99214 OFFICE O/P EST MOD 30 MIN: CPT | Mod: S$GLB,,, | Performed by: PHYSICIAN ASSISTANT

## 2024-08-29 PROCEDURE — 99999 PR PBB SHADOW E&M-EST. PATIENT-LVL III: CPT | Mod: PBBFAC,,, | Performed by: PHYSICIAN ASSISTANT

## 2024-08-29 PROCEDURE — 3008F BODY MASS INDEX DOCD: CPT | Mod: CPTII,S$GLB,, | Performed by: PHYSICIAN ASSISTANT

## 2024-08-29 PROCEDURE — 1159F MED LIST DOCD IN RCRD: CPT | Mod: CPTII,S$GLB,, | Performed by: PHYSICIAN ASSISTANT

## 2024-08-29 NOTE — PROGRESS NOTES
Subjective     Patient ID: Brook Abraham is a 33 y.o. female.    Chief Complaint: Otalgia (Pt states she has had pain and pressure in right ear since Monday. Became worse yesterday)    Patient is a 33 year old female here to see me today for the first time for evaluation of pain and pressure in and around right ear, face and neck.  She describes shooting pains into the right side of her neck as well.  Started with right ear pressure 3 days ago but then yesterday had worsening pain and pressure.  She has had similar symptoms earlier this year.  She follows with Neurology at  Clinic for migraines; was on Topamax with some relief initially but then no longer beneficial.  Changed to Propranolol about 3-4 weeks ago but does not find it beneficial.  She also started with vertigo about two days ago.  Describes spinning, lightheadedness and unsteadiness with any changes in position; sudden head movements, lying down, standing up, bending over.  She has recently increased her salt intake past 3-4 weeks for suspected POTS.    She describes slight fluctuations in her hearing in the right ear when the pressure is most intense.  Has occasional tinnitus in the right ear (not bothersome currently). Associated symptoms include right ear popping, right-sided facial pain, saloni-orbital and neck and jaw pain (present in the past).     She grinds her teeth and wears a nightguard.      Review of Systems   Constitutional: Negative.  Negative for fever.   HENT:  Positive for ear pain, hearing loss and tinnitus. Negative for nasal congestion, ear discharge and rhinorrhea.    Eyes:  Positive for photophobia.   Respiratory: Negative.     Cardiovascular:  Positive for chest pain.   Gastrointestinal:  Positive for constipation, diarrhea and vomiting.   Endocrine: Positive for cold intolerance.   Genitourinary: Negative.    Integumentary:  Negative.   Allergic/Immunologic: Positive for food allergies.   Neurological:  Positive for  dizziness, light-headedness and headaches.   Hematological:  Bruises/bleeds easily.   Psychiatric/Behavioral:  Positive for decreased concentration and sleep disturbance. The patient is nervous/anxious.           Objective     Physical Exam  Constitutional:       Appearance: Normal appearance.   HENT:      Head: Normocephalic and atraumatic.      Jaw: No trismus.      Right Ear: Tympanic membrane, ear canal and external ear normal. No drainage, swelling or tenderness. No middle ear effusion. Tympanic membrane is not injected, erythematous or retracted.      Left Ear: Ear canal and external ear normal. There is impacted cerumen (removal described below). Tympanic membrane is not retracted.      Nose: Nose normal. No congestion or rhinorrhea.      Mouth/Throat:      Mouth: Mucous membranes are moist.      Pharynx: Oropharynx is clear. No posterior oropharyngeal erythema.   Eyes:      Pupils: Pupils are equal, round, and reactive to light.   Pulmonary:      Effort: Pulmonary effort is normal.   Lymphadenopathy:      Cervical: No cervical adenopathy.   Neurological:      General: No focal deficit present.      Mental Status: She is alert.   Psychiatric:         Behavior: Behavior is cooperative.      Comments: tearful       Procedure Note    CHIEF COMPLAINT:  Cerumen Impaction    Description:  The patient was seated in an exam chair.  An ear speculum was placed in the left EAC and was examined under the microscope.  Suction and/or alligator forceps were used to remove a large cerumen impaction.  The tympanic membrane was visualized and was normal in appearance.    The patient tolerated the procedure well.        Tympanograms:  AD  0.4 mL @ -60 daPa, ECV 1.3 mL  AS  0.6 mL @ -125 daPa, ECV 1.4 mL      Imaging   MRI 2/19/24  FINDINGS: There is no restricted diffusion to suggest acute infarction.  No   focal encephalomalacia.  The brain parenchyma demonstrates no edema, mass, or   mass effect.  No significant white matter  signal hyperintensities.  No abnormal   enhancement.   The cerebellar tonsils are in expected location.  The visualized portions of   the sella appear within normal limits.   No intracranial hemorrhage or extra-axial fluid collections.  Stable size and   configuration of the ventricular system.  There is no shift of the midline.   Basal cisterns are patent.  There are preserved arterial flow-voids on T2   weighted imaging.  The paranasal sinuses and mastoid air cells are clear.  The   globes and orbits appear within normal limits.   Marrow signal is within normal limits.                          Assessment and Plan     1. Pressure sensation in right ear    2. Sensorineural hearing loss (SNHL), bilateral    3. Dizziness    4. Migraine without status migrainosus, not intractable, unspecified migraine type          Plan:         Reassured patient that her ear exam today is unremarkable with no middle ear fluid or signs of OE.  Her tympanogram is normal as well.  I suspect that her ear pressure and pain/pressure in right face, neck and jaw may be related to poorly controlled migraines.  She was changed from Topamax to Propranolol about one month ago and has not found it beneficial.  I recommend she reach out to her neurologist for next step in treatment plan.  She is interested in getting a second opinion from neurology here.    She has had recent MRI without evidence of IAC lesion     Possibly atypical Meniere's as alternative dx; we discussed that her recent increased salt intake for suspected POTS could be exacerbating her symptoms but that would not typically cause shooting pains in the neck.  We could consider EcoG and VNG if dizziness persists or worsens.                  No follow-ups on file.    Answers submitted by the patient for this visit:  Review of Symptoms Questionnaire  (Submitted on 8/28/2024)  Ear infection(s)?: Yes  Muscle aches / pain?: Yes  Seasonal Allergies?: Yes

## 2024-09-05 ENCOUNTER — LAB VISIT (OUTPATIENT)
Dept: LAB | Facility: HOSPITAL | Age: 34
End: 2024-09-05
Attending: STUDENT IN AN ORGANIZED HEALTH CARE EDUCATION/TRAINING PROGRAM
Payer: COMMERCIAL

## 2024-09-05 ENCOUNTER — OFFICE VISIT (OUTPATIENT)
Dept: ALLERGY | Facility: CLINIC | Age: 34
End: 2024-09-05
Payer: COMMERCIAL

## 2024-09-05 VITALS
OXYGEN SATURATION: 99 % | WEIGHT: 148.56 LBS | HEART RATE: 80 BPM | BODY MASS INDEX: 23.88 KG/M2 | DIASTOLIC BLOOD PRESSURE: 69 MMHG | TEMPERATURE: 99 F | HEIGHT: 66 IN | SYSTOLIC BLOOD PRESSURE: 102 MMHG

## 2024-09-05 DIAGNOSIS — B02.29 HZV (HERPES ZOSTER VIRUS) POST HERPETIC NEURALGIA: ICD-10-CM

## 2024-09-05 DIAGNOSIS — B99.9 RECURRENT INFECTIONS: Primary | ICD-10-CM

## 2024-09-05 DIAGNOSIS — B99.9 RECURRENT INFECTIONS: ICD-10-CM

## 2024-09-05 LAB
C3 SERPL-MCNC: 112 MG/DL (ref 50–180)
C4 SERPL-MCNC: 27 MG/DL (ref 11–44)

## 2024-09-05 PROCEDURE — 3008F BODY MASS INDEX DOCD: CPT | Mod: CPTII,S$GLB,, | Performed by: STUDENT IN AN ORGANIZED HEALTH CARE EDUCATION/TRAINING PROGRAM

## 2024-09-05 PROCEDURE — 1160F RVW MEDS BY RX/DR IN RCRD: CPT | Mod: CPTII,S$GLB,, | Performed by: STUDENT IN AN ORGANIZED HEALTH CARE EDUCATION/TRAINING PROGRAM

## 2024-09-05 PROCEDURE — 86161 COMPLEMENT/FUNCTION ACTIVITY: CPT | Performed by: STUDENT IN AN ORGANIZED HEALTH CARE EDUCATION/TRAINING PROGRAM

## 2024-09-05 PROCEDURE — 86353 LYMPHOCYTE TRANSFORMATION: CPT | Performed by: STUDENT IN AN ORGANIZED HEALTH CARE EDUCATION/TRAINING PROGRAM

## 2024-09-05 PROCEDURE — 99999 PR PBB SHADOW E&M-EST. PATIENT-LVL III: CPT | Mod: PBBFAC,,, | Performed by: STUDENT IN AN ORGANIZED HEALTH CARE EDUCATION/TRAINING PROGRAM

## 2024-09-05 PROCEDURE — 86317 IMMUNOASSAY INFECTIOUS AGENT: CPT | Performed by: STUDENT IN AN ORGANIZED HEALTH CARE EDUCATION/TRAINING PROGRAM

## 2024-09-05 PROCEDURE — 1159F MED LIST DOCD IN RCRD: CPT | Mod: CPTII,S$GLB,, | Performed by: STUDENT IN AN ORGANIZED HEALTH CARE EDUCATION/TRAINING PROGRAM

## 2024-09-05 PROCEDURE — 86317 IMMUNOASSAY INFECTIOUS AGENT: CPT | Mod: 59 | Performed by: STUDENT IN AN ORGANIZED HEALTH CARE EDUCATION/TRAINING PROGRAM

## 2024-09-05 PROCEDURE — 36415 COLL VENOUS BLD VENIPUNCTURE: CPT | Performed by: STUDENT IN AN ORGANIZED HEALTH CARE EDUCATION/TRAINING PROGRAM

## 2024-09-05 PROCEDURE — 86162 COMPLEMENT TOTAL (CH50): CPT | Performed by: STUDENT IN AN ORGANIZED HEALTH CARE EDUCATION/TRAINING PROGRAM

## 2024-09-05 PROCEDURE — 86684 HEMOPHILUS INFLUENZA ANTIBDY: CPT | Performed by: STUDENT IN AN ORGANIZED HEALTH CARE EDUCATION/TRAINING PROGRAM

## 2024-09-05 PROCEDURE — 99215 OFFICE O/P EST HI 40 MIN: CPT | Mod: S$GLB,,, | Performed by: STUDENT IN AN ORGANIZED HEALTH CARE EDUCATION/TRAINING PROGRAM

## 2024-09-05 PROCEDURE — 86160 COMPLEMENT ANTIGEN: CPT | Mod: 59 | Performed by: STUDENT IN AN ORGANIZED HEALTH CARE EDUCATION/TRAINING PROGRAM

## 2024-09-05 PROCEDURE — 3078F DIAST BP <80 MM HG: CPT | Mod: CPTII,S$GLB,, | Performed by: STUDENT IN AN ORGANIZED HEALTH CARE EDUCATION/TRAINING PROGRAM

## 2024-09-05 PROCEDURE — 86160 COMPLEMENT ANTIGEN: CPT | Performed by: STUDENT IN AN ORGANIZED HEALTH CARE EDUCATION/TRAINING PROGRAM

## 2024-09-05 PROCEDURE — 3074F SYST BP LT 130 MM HG: CPT | Mod: CPTII,S$GLB,, | Performed by: STUDENT IN AN ORGANIZED HEALTH CARE EDUCATION/TRAINING PROGRAM

## 2024-09-05 RX ORDER — GABAPENTIN 100 MG/1
100 CAPSULE ORAL 3 TIMES DAILY
Qty: 90 CAPSULE | Refills: 11 | Status: SHIPPED | OUTPATIENT
Start: 2024-09-05 | End: 2025-09-05

## 2024-09-05 NOTE — ASSESSMENT & PLAN NOTE
- Trial Gabapentin 100 mg TID   - Right trigeminal distribution  - Continue Valtrex ppx  - Will continue to monitor and reassess

## 2024-09-05 NOTE — ASSESSMENT & PLAN NOTE
- AH50 low  - Decreased Tetanus response of lymphocyte proliferation   - Above to be repeated for reassessment

## 2024-09-05 NOTE — PROGRESS NOTES
Allergy and Immunology  Established Patient Clinic Note    Date: 9/5/2024  Chief Complaint   Patient presents with    Follow-up       History  Brook Abraham is a 34 y.o. female being seen for follow-up today.    Post Herpetic Neuralgia   - Trial Gabapentin 100 mg TID   - Right trigeminal distribution  - Continue Valtrex ppx     Recurrent Infections  - AH50 low  - Decreased Tetanus response of lymphocyte proliferation   - Above to be repeated for reassessment    Initial HPI:  - Recurrent Sinus Infections:   - Patient reported recurrent sinus infection since adolescents   - Unclear if bacterial v viral URI but immune evaluation and vaccination at this time  - Will assess humoral function as well as splenic marginal B cell function given EBV   - Patient does not clinically appear to be asplenic/functionally asplenic      - HSV/EBV/VZV/HHV7:  - Serum IgG testing indicated prior infections   - Concern for oral ulcer and ocular ulcers  - Need to consider rheumatologic v infectious causes  - Patient reported painful oral ulcer inside mouth NOT on lips   - Painful ulcers inconsistent with lupus - possible HSV or Behcet's   - Denied painful genital ulcers   - No hx of seizures or aseptic meningitis noted   - Neurologic evaluation with ocular and peripheral neurologic complaints   - Does not appear to have atypical viral presentation or condylomas   - NK and CD8 cells to be evaluation      - Other:   - IgG positive for Salmonella infection in the past   - No memory of circumstances linked to this infectious exposure      POTS?  - Tryptase normal on screening - no concern for mast cell disease at this time   - No hx of anaphylaxis to Hymenoptera flying or non-flying insects   - No hx of urticaria or unknown cause of anaphylaxis      Drug Allergy  - Bactrim: Age 21 years              - SOB, sensation of throat closure and collapse               - Occurred 5-10 minutes after ingestion of 1st dose               -  High-risk hx and no clinical indication for Bactrim at this time               - Recommended skin testing if needed versus oral desensitization for temporary tolerance   - Opioids: Late teens - reported SOB, sensation of throat closure and collapse               - Reported similar sensation when taking Bactrim               - Specifically occurred with IV Morphine for ruptured ovarian cyst               - Patient has tolerated Codeine since reported Morphine reaction     Allergies, PMH, PSH, Social, and Family History were reviewed.    Current Outpatient Medications on File Prior to Visit   Medication Sig Dispense Refill    valACYclovir (VALTREX) 500 MG tablet Take 1,000 mg by mouth.       Current Facility-Administered Medications on File Prior to Visit   Medication Dose Route Frequency Provider Last Rate Last Admin    medroxyPROGESTERone (DEPO-PROVERA) syringe 150 mg  150 mg Intramuscular Q90 Days    150 mg at 07/08/24 1028     Physical Examination  Vitals:    09/05/24 0717   BP: 102/69   Pulse: 80   Temp: 98.8 °F (37.1 °C)     GENERAL:  female in no apparent distress and well developed and well nourished  HEAD:  Normocephalic, without obvious abnormality, atraumatic  EYES: sclera anicteric, conjunctiva normochromic  EARS: normal TM's and external ear canals both ears  NOSE: without erythema or discharge, clear discharge, turbinates normal    OROPHARYNX: moist mucous membranes without erythema, exudates or petechiae  LYMPH NODES: normal, supple, no lymphadenopathy  LUNGS: clear to auscultation, no wheezes, rales or rhonchi, symmetric air entry.  HEART: normal rate, regular rhythm, normal S1, S2, no murmurs, rubs, clicks or gallops.  ABDOMEN: soft, nontender, nondistended, no masses or organomegaly.  MUSCULOSKELETAL: no gross joint deformity or swelling.  NEURO: alert, oriented, normal speech, no focal findings or movement disorder noted.  SKIN: normal coloration and turgor, no rashes, no suspicious skin lesions  noted.     Assessment/Plan:   Problem List Items Addressed This Visit          Neuro    HZV (herpes zoster virus) post herpetic neuralgia    Current Assessment & Plan     - Trial Gabapentin 100 mg TID   - Right trigeminal distribution  - Continue Valtrex ppx  - Will continue to monitor and reassess           Relevant Medications    gabapentin (NEURONTIN) 100 MG capsule       ID    Recurrent infections - Primary    Overview     - Recurrent Sinus Infections:   - Patient reported recurrent sinus infection since adolescents   - Unclear if bacterial v viral URI but immune evaluation and vaccination at this time  - Will assess humoral function as well as splenic marginal B cell function given EBV   - Patient does not clinically appear to be asplenic/functionally asplenic      - HSV/EBV/VZV/HHV7:  - Serum IgG testing indicated prior infections   - Concern for oral ulcer and ocular ulcers  - Need to consider rheumatologic v infectious causes  - Patient reported painful oral ulcer inside mouth NOT on lips   - Painful ulcers inconsistent with lupus - possible HSV or bechet's   - Denied painful genital ulcers   - No hx of seizures or aseptic meningitis noted   - Neurologic evaluation with ocular and peripheral neurologic complaints   - Does not appear to have atypical viral presentation or condylomas   - NK and CD8 cells to be evaluation     - Other:   - IgG positive for Salmonella infection in the past   - No memory of circumstances linked to this infectious exposure          Current Assessment & Plan     - AH50 low  - Decreased Tetanus response of lymphocyte proliferation   - Above to be repeated for reassessment         Relevant Orders    Streptococcus Pneumoniae IgG Antibody (23 Serotypes), MAID    Complement, Alternate Pathway (AH50)    Complement, Total    C3 COMPLEMENT    C4 COMPLEMENT    DIPHTHERIA / TETANUS ANTIBODY PANEL    HAEMOPHILIUS INFLUENZAE B AB IGG    LYMPHOCYTE PROLIFERATION ANTIGENS     Follow up:  Follow  up in about 2 months (around 11/5/2024).    Greater than 40 minutes in total spent on the healthcare of this complex immunology patient.     Harish Gilmore MD   [facility]  Allergy and Immunology

## 2024-09-07 LAB
DIPHTHERIA IGG VALUE: 0.1 IU/ML
DIPHTHERIA TOXOID IGG ANTIBODY: POSITIVE
TETANUS TOXOID IGG AB: POSITIVE
TETANUS TOXOID IGG VALUE: 1.19 IU/ML

## 2024-09-09 LAB
AH50 ACT/NOR SER IA: 91 %OF NORM
ANNOTATION COMMENT IMP: NORMAL
CH50 SERPL-ACNC: 98 U/ML (ref 42–95)
FLOW CYTOMETRY SPECIALIST REVIEW: NORMAL
HAEM INFLU B IGG SER IA-MCNC: >=9 MG/L
LPT CA MAX/CD3 BLD FC-NFR: NORMAL %
LPT CA MAX/CD45 BLD FC-NFR: NORMAL %
LPT TT MAX/CD3 BLD FC-NFR: NORMAL %
LPT TT MAX/CD45 BLD FC-NFR: NORMAL %
VIAB OF LYMPHS DAY 0: NORMAL

## 2024-09-13 LAB
IMMUNOLOGIST REVIEW: NORMAL
S PN DA SERO 19F IGG SER-MCNC: 3.4 MCG/ML
S PNEUM DA 1 IGG SER-MCNC: 2.4 MCG/ML
S PNEUM DA 10A IGG SER-MCNC: 3.1 MCG/ML
S PNEUM DA 11A IGG SER-MCNC: 2.1 MCG/ML
S PNEUM DA 12F IGG SER-MCNC: 1 MCG/ML
S PNEUM DA 14 IGG SER-MCNC: 1.9 MCG/ML
S PNEUM DA 15B IGG SER-MCNC: NORMAL MCG/ML
S PNEUM DA 17F IGG SER-MCNC: 1.8 MCG/ML
S PNEUM DA 18C IGG SER-MCNC: 5.3 MCG/ML
S PNEUM DA 19A IGG SER-MCNC: 21.2 MCG/ML
S PNEUM DA 2 IGG SER-MCNC: 4 MCG/ML
S PNEUM DA 20A IGG SER-MCNC: 6.9 MCG/ML
S PNEUM DA 22F IGG SER-MCNC: NORMAL MCG/ML
S PNEUM DA 23F IGG SER-MCNC: 0.5 MCG/ML
S PNEUM DA 3 IGG SER-MCNC: 0.6 MCG/ML
S PNEUM DA 33F IGG SER-MCNC: 22.7 MCG/ML
S PNEUM DA 4 IGG SER-MCNC: 9.6 MCG/ML
S PNEUM DA 5 IGG SER-MCNC: 0.5 MCG/ML
S PNEUM DA 6B IGG SER-MCNC: 0.7 MCG/ML
S PNEUM DA 7F IGG SER-MCNC: 1.2 MCG/ML
S PNEUM DA 8 IGG SER-MCNC: >100 MCG/ML
S PNEUM DA 9N IGG SER-MCNC: 6.7 MCG/ML
S PNEUM DA 9V IGG SER-MCNC: 1.8 MCG/ML

## 2024-09-23 ENCOUNTER — CLINICAL SUPPORT (OUTPATIENT)
Dept: OBSTETRICS AND GYNECOLOGY | Facility: CLINIC | Age: 34
End: 2024-09-23
Payer: COMMERCIAL

## 2024-09-23 DIAGNOSIS — Z30.42 ENCOUNTER FOR DEPO-PROVERA CONTRACEPTION: Primary | ICD-10-CM

## 2024-09-23 PROCEDURE — 99999 PR PBB SHADOW E&M-EST. PATIENT-LVL II: CPT | Mod: PBBFAC,,,

## 2024-09-23 PROCEDURE — 96372 THER/PROPH/DIAG INJ SC/IM: CPT | Mod: S$GLB,,, | Performed by: OBSTETRICS & GYNECOLOGY

## 2024-09-23 RX ORDER — ATOGEPANT 60 MG/1
1 TABLET ORAL
COMMUNITY

## 2024-09-23 RX ORDER — METHYLPREDNISOLONE 4 MG/1
TABLET ORAL
COMMUNITY
Start: 2024-09-09

## 2024-09-23 RX ADMIN — MEDROXYPROGESTERONE ACETATE 150 MG: 150 INJECTION, SUSPENSION INTRAMUSCULAR at 08:09

## 2024-09-23 NOTE — PROGRESS NOTES
150mg of medroxyprogesterone administered IM to right deltoid. Patient tolerated well. No pain or discomfort noted. Advised to wait 15 minutes after in clinic. Patient verbalized understanding. Next injection scheduled.

## 2024-09-26 ENCOUNTER — PATIENT MESSAGE (OUTPATIENT)
Dept: ALLERGY | Facility: CLINIC | Age: 34
End: 2024-09-26
Payer: COMMERCIAL

## 2024-10-31 ENCOUNTER — LAB VISIT (OUTPATIENT)
Dept: LAB | Facility: HOSPITAL | Age: 34
End: 2024-10-31
Attending: STUDENT IN AN ORGANIZED HEALTH CARE EDUCATION/TRAINING PROGRAM
Payer: COMMERCIAL

## 2024-10-31 ENCOUNTER — OFFICE VISIT (OUTPATIENT)
Dept: ALLERGY | Facility: CLINIC | Age: 34
End: 2024-10-31
Payer: COMMERCIAL

## 2024-10-31 VITALS
TEMPERATURE: 98 F | HEART RATE: 76 BPM | DIASTOLIC BLOOD PRESSURE: 80 MMHG | HEIGHT: 66 IN | WEIGHT: 158.31 LBS | SYSTOLIC BLOOD PRESSURE: 118 MMHG | BODY MASS INDEX: 25.44 KG/M2

## 2024-10-31 DIAGNOSIS — K13.79 RECURRENT ORAL ULCERS: ICD-10-CM

## 2024-10-31 DIAGNOSIS — K13.79 RECURRENT ORAL ULCERS: Primary | ICD-10-CM

## 2024-10-31 DIAGNOSIS — B99.9 RECURRENT INFECTIONS: ICD-10-CM

## 2024-10-31 DIAGNOSIS — G90.A POTS (POSTURAL ORTHOSTATIC TACHYCARDIA SYNDROME): ICD-10-CM

## 2024-10-31 PROCEDURE — 3074F SYST BP LT 130 MM HG: CPT | Mod: CPTII,S$GLB,, | Performed by: STUDENT IN AN ORGANIZED HEALTH CARE EDUCATION/TRAINING PROGRAM

## 2024-10-31 PROCEDURE — 1159F MED LIST DOCD IN RCRD: CPT | Mod: CPTII,S$GLB,, | Performed by: STUDENT IN AN ORGANIZED HEALTH CARE EDUCATION/TRAINING PROGRAM

## 2024-10-31 PROCEDURE — 99999 PR PBB SHADOW E&M-EST. PATIENT-LVL III: CPT | Mod: PBBFAC,,, | Performed by: STUDENT IN AN ORGANIZED HEALTH CARE EDUCATION/TRAINING PROGRAM

## 2024-10-31 PROCEDURE — 3008F BODY MASS INDEX DOCD: CPT | Mod: CPTII,S$GLB,, | Performed by: STUDENT IN AN ORGANIZED HEALTH CARE EDUCATION/TRAINING PROGRAM

## 2024-10-31 PROCEDURE — 36415 COLL VENOUS BLD VENIPUNCTURE: CPT | Performed by: STUDENT IN AN ORGANIZED HEALTH CARE EDUCATION/TRAINING PROGRAM

## 2024-10-31 PROCEDURE — 83520 IMMUNOASSAY QUANT NOS NONAB: CPT | Performed by: STUDENT IN AN ORGANIZED HEALTH CARE EDUCATION/TRAINING PROGRAM

## 2024-10-31 PROCEDURE — 3079F DIAST BP 80-89 MM HG: CPT | Mod: CPTII,S$GLB,, | Performed by: STUDENT IN AN ORGANIZED HEALTH CARE EDUCATION/TRAINING PROGRAM

## 2024-10-31 PROCEDURE — 86353 LYMPHOCYTE TRANSFORMATION: CPT | Performed by: STUDENT IN AN ORGANIZED HEALTH CARE EDUCATION/TRAINING PROGRAM

## 2024-10-31 PROCEDURE — 1160F RVW MEDS BY RX/DR IN RCRD: CPT | Mod: CPTII,S$GLB,, | Performed by: STUDENT IN AN ORGANIZED HEALTH CARE EDUCATION/TRAINING PROGRAM

## 2024-10-31 PROCEDURE — 99214 OFFICE O/P EST MOD 30 MIN: CPT | Mod: S$GLB,,, | Performed by: STUDENT IN AN ORGANIZED HEALTH CARE EDUCATION/TRAINING PROGRAM

## 2024-10-31 RX ORDER — VALACYCLOVIR HYDROCHLORIDE 500 MG/1
TABLET, FILM COATED ORAL
Qty: 90 TABLET | Refills: 11 | Status: SHIPPED | OUTPATIENT
Start: 2024-10-31 | End: 2025-10-31

## 2024-11-04 LAB
ANNOTATION COMMENT IMP: NORMAL
FLOW CYTOMETRY SPECIALIST REVIEW: NORMAL
LPT CA MAX/CD3 BLD FC-NFR: NORMAL %
LPT CA MAX/CD45 BLD FC-NFR: NORMAL %
LPT TT MAX/CD3 BLD FC-NFR: NORMAL %
LPT TT MAX/CD45 BLD FC-NFR: NORMAL %
TRYPTASE LEVEL: 2.3 NG/ML
VIAB OF LYMPHS DAY 0: NORMAL

## 2024-12-09 ENCOUNTER — CLINICAL SUPPORT (OUTPATIENT)
Dept: OBSTETRICS AND GYNECOLOGY | Facility: CLINIC | Age: 34
End: 2024-12-09
Payer: COMMERCIAL

## 2024-12-09 DIAGNOSIS — Z30.42 ENCOUNTER FOR DEPO-PROVERA CONTRACEPTION: Primary | ICD-10-CM

## 2024-12-09 PROCEDURE — 96372 THER/PROPH/DIAG INJ SC/IM: CPT | Mod: S$GLB,,, | Performed by: OBSTETRICS & GYNECOLOGY

## 2024-12-09 PROCEDURE — 99999 PR PBB SHADOW E&M-EST. PATIENT-LVL II: CPT | Mod: PBBFAC,,,

## 2024-12-09 RX ADMIN — MEDROXYPROGESTERONE ACETATE 150 MG: 150 INJECTION, SUSPENSION INTRAMUSCULAR at 08:12

## 2024-12-09 NOTE — PROGRESS NOTES
150mg of medroxyprogesterone administered IM to left deltoid. Patient tolerated well. No pain or discomfort noted. Advised to wait 15 minutes after in clinic. Patient verbalized understanding. Next injection scheduled.

## 2025-01-03 ENCOUNTER — OFFICE VISIT (OUTPATIENT)
Dept: ALLERGY | Facility: CLINIC | Age: 35
End: 2025-01-03
Payer: COMMERCIAL

## 2025-01-03 VITALS
SYSTOLIC BLOOD PRESSURE: 104 MMHG | WEIGHT: 170.19 LBS | HEIGHT: 66 IN | BODY MASS INDEX: 27.35 KG/M2 | OXYGEN SATURATION: 99 % | TEMPERATURE: 99 F | HEART RATE: 77 BPM | DIASTOLIC BLOOD PRESSURE: 66 MMHG

## 2025-01-03 DIAGNOSIS — K13.79 RECURRENT ORAL ULCERS: Primary | ICD-10-CM

## 2025-01-03 DIAGNOSIS — G90.A POTS (POSTURAL ORTHOSTATIC TACHYCARDIA SYNDROME): ICD-10-CM

## 2025-01-03 DIAGNOSIS — B02.29 HZV (HERPES ZOSTER VIRUS) POST HERPETIC NEURALGIA: ICD-10-CM

## 2025-01-03 DIAGNOSIS — T50.905A ADVERSE EFFECT OF DRUG, INITIAL ENCOUNTER: ICD-10-CM

## 2025-01-03 DIAGNOSIS — B99.9 RECURRENT INFECTIONS: ICD-10-CM

## 2025-01-03 PROCEDURE — 99999 PR PBB SHADOW E&M-EST. PATIENT-LVL IV: CPT | Mod: PBBFAC,,, | Performed by: STUDENT IN AN ORGANIZED HEALTH CARE EDUCATION/TRAINING PROGRAM

## 2025-01-03 RX ORDER — METOPROLOL TARTRATE 25 MG/1
25 TABLET, FILM COATED ORAL 2 TIMES DAILY
COMMUNITY

## 2025-01-03 NOTE — PROGRESS NOTES
Allergy and Immunology  Established Patient Clinic Note    Date: 1/3/2025  Chief Complaint   Patient presents with    Follow-up     Pt states that she was diagnosed with pots: Cardiologist      History  Brook Abraham is a 34 y.o. female being seen for follow-up today.    Post Herpetic Neuralgia   Recurrent Herpetic Ulcers  Recurrent infections   - Outbreak when missing Valtrex over holiday break   - Otherwise well controlled      POTS  - Tryptase is normal   - On BB with Cardiology      Drug Allergy  - Bactrim: Age 21 years              - SOB, sensation of throat closure and collapse               - Occurred 5-10 minutes after ingestion of 1st dose               - High-risk hx and no clinical indication for Bactrim at this time               - Recommended skin testing if needed versus oral desensitization for temporary tolerance   - Opioids: Late teens - reported SOB, sensation of throat closure and collapse               - Reported similar sensation when taking Bactrim               - Specifically occurred with IV Morphine for ruptured ovarian cyst               - Patient has tolerated Codeine since reported Morphine reaction     Allergies, PMH, PSH, Social, and Family History were reviewed.    Current Outpatient Medications on File Prior to Visit   Medication Sig Dispense Refill    metoprolol tartrate (LOPRESSOR) 25 MG tablet Take 25 mg by mouth 2 (two) times daily.      valACYclovir (VALTREX) 500 MG tablet Take 2 tablets (1,000 mg total) by mouth once daily AND 1 tablet (500 mg total) every evening. 90 tablet 11    QULIPTA 60 mg Tab Take 1 tablet by mouth.      [DISCONTINUED] gabapentin (NEURONTIN) 100 MG capsule Take 1 capsule (100 mg total) by mouth 3 (three) times daily. 90 capsule 11    [DISCONTINUED] methylPREDNISolone (MEDROL DOSEPACK) 4 mg tablet FOLLOW PACKAGE DIRECTIONS       Current Facility-Administered Medications on File Prior to Visit   Medication Dose Route Frequency Provider Last Rate  Last Admin    medroxyPROGESTERone (DEPO-PROVERA) syringe 150 mg  150 mg Intramuscular Q90 Days    150 mg at 12/09/24 0808     Physical Examination  Vitals:    01/03/25 0821   BP: 104/66   Pulse: 77   Temp: 98.6 °F (37 °C)     GENERAL:  female in no apparent distress and well developed and well nourished  HEAD:  Normocephalic, without obvious abnormality, atraumatic  EYES: sclera anicteric, conjunctiva normochromic  EARS: normal TM's and external ear canals both ears  NOSE: without erythema or discharge, clear discharge, turbinates normal    OROPHARYNX: moist mucous membranes without erythema, exudates or petechiae  LYMPH NODES: normal, supple, no lymphadenopathy  LUNGS: clear to auscultation, no wheezes, rales or rhonchi, symmetric air entry.  HEART: normal rate, regular rhythm, normal S1, S2, no murmurs, rubs, clicks or gallops.  ABDOMEN: soft, nontender, nondistended, no masses or organomegaly.  MUSCULOSKELETAL: no gross joint deformity or swelling.  NEURO: alert, oriented, normal speech, no focal findings or movement disorder noted.  SKIN: normal coloration and turgor, no rashes, no suspicious skin lesions noted.     Assessment/Plan:   Problem List Items Addressed This Visit       Drug reaction    Overview     - Bactrim: Age 21 years   - SOB, sensation of throat closure and collapse    - Occurred 5-10 minutes after ingestion of 1st dose    - High-risk hx and no clinical indication for Bactrim at this time    - Recommended skin testing if needed versus oral desensitization for temporary tolerance   - Opioids: Late teens - reported SOB, sensation of throat closure and collapse    - Reported similar sensation when taking Bactrim    - Specifically occurred with IV Morphine for ruptured ovarian cyst    - Patient has tolerated Codeine since reported Morphine reaction          POTS (postural orthostatic tachycardia syndrome)    Current Assessment & Plan     - Managed by Cardiology   - On BB; normal tryptase           Recurrent infections    Overview     - Recurrent Sinus Infections:   - Patient reported recurrent sinus infection since adolescents   - Unclear if bacterial v viral URI but immune evaluation and vaccination at this time  - Will assess humoral function as well as splenic marginal B cell function given EBV   - Patient does not clinically appear to be asplenic/functionally asplenic      - HSV/EBV/VZV/HHV7:  - Serum IgG testing indicated prior infections   - Concern for oral ulcer and ocular ulcers  - Need to consider rheumatologic v infectious causes  - Patient reported painful oral ulcer inside mouth NOT on lips   - Painful ulcers inconsistent with lupus - possible HSV or bechet's   - Denied painful genital ulcers   - No hx of seizures or aseptic meningitis noted   - Neurologic evaluation with ocular and peripheral neurologic complaints   - Does not appear to have atypical viral presentation or condylomas   - NK and CD8 cells to be evaluation     - Other:   - IgG positive for Salmonella infection in the past   - No memory of circumstances linked to this infectious exposure          Current Assessment & Plan     - No acute complaints or changes   - Monitoring at this time          Recurrent oral ulcers - Primary    HZV (herpes zoster virus) post herpetic neuralgia     Follow up:  Follow up in about 5 months (around 6/3/2025).    Harish Gilmore MD   [facility]  Allergy and Immunology

## 2025-01-06 ENCOUNTER — OFFICE VISIT (OUTPATIENT)
Dept: OBSTETRICS AND GYNECOLOGY | Facility: CLINIC | Age: 35
End: 2025-01-06
Payer: COMMERCIAL

## 2025-01-06 VITALS
WEIGHT: 168.19 LBS | SYSTOLIC BLOOD PRESSURE: 118 MMHG | DIASTOLIC BLOOD PRESSURE: 82 MMHG | BODY MASS INDEX: 27.03 KG/M2 | HEIGHT: 66 IN

## 2025-01-06 DIAGNOSIS — N94.3 PMS (PREMENSTRUAL SYNDROME): Primary | ICD-10-CM

## 2025-01-06 PROCEDURE — 3008F BODY MASS INDEX DOCD: CPT | Mod: CPTII,S$GLB,, | Performed by: OBSTETRICS & GYNECOLOGY

## 2025-01-06 PROCEDURE — 99999 PR PBB SHADOW E&M-EST. PATIENT-LVL III: CPT | Mod: PBBFAC,,, | Performed by: OBSTETRICS & GYNECOLOGY

## 2025-01-06 PROCEDURE — 99213 OFFICE O/P EST LOW 20 MIN: CPT | Mod: S$GLB,,, | Performed by: OBSTETRICS & GYNECOLOGY

## 2025-01-06 PROCEDURE — 3074F SYST BP LT 130 MM HG: CPT | Mod: CPTII,S$GLB,, | Performed by: OBSTETRICS & GYNECOLOGY

## 2025-01-06 PROCEDURE — 3079F DIAST BP 80-89 MM HG: CPT | Mod: CPTII,S$GLB,, | Performed by: OBSTETRICS & GYNECOLOGY

## 2025-01-06 PROCEDURE — 1159F MED LIST DOCD IN RCRD: CPT | Mod: CPTII,S$GLB,, | Performed by: OBSTETRICS & GYNECOLOGY

## 2025-01-06 RX ORDER — MECOBALAMIN 1000 MCG
TABLET,CHEWABLE ORAL
COMMUNITY

## 2025-01-06 RX ORDER — METOPROLOL SUCCINATE 25 MG/1
25 TABLET, EXTENDED RELEASE ORAL
COMMUNITY
Start: 2024-11-25

## 2025-01-06 NOTE — PROGRESS NOTES
"Subjective     Patient ID: Brook Abraham is a 34 y.o. female.    Chief Complaint:  Follow-up      History of Present Illness  Follow-up      Presents to f/u response to depo provera.  To recap from prior visits:  " Pt was diagnosed with PCOS in the past, although her testosterone and DHEA levels were always really low.  Periods are monthly, but irregular.  She has had several neurological issues (constant vision changes, right optic neruitis, now migraines, and memory issues).  All problems exacerbate just prior to her periods.  Has had normal MRI brain, normal findings on lumbar punctures, and negative rheum work-up.  Pt is MM with her .  Had several SAB's.  He had a vasectomy last year.  She has no desire for future childbearing.  Last pap: 10/2021: normal  Pt's MGM had breast cancer, and mom had ovarian cancer; patient underwent genetic testing, which was negative"     We discussed cyclic prometrium vs depo provera.  Pt opted to try cyclic prometrium.  When on it, she states she feels great.  The weeks off it, all her symptoms return.  Also, her first period was very heavy, and second period was lighter, but lasted longer.  Pt is ready to try depo provera."    Since last appointment 24, pt has received 3 doses of depo provera.  Achieved amenorrhea, which she likes b/c periods were irregular prior to that.  She notices more significant and frequent mood swings and weight gain.  Has POTS, so exercising without experiencing presyncope has been difficult.  Pt with hx of migraines.  Has had extensive neuro work-up.  Pt asked about checking hormone levels to see if that could be contributing to any of the symptoms she experienced.  States everything seemed to occur after she had mononucleosis.    GYN & OB History  No LMP recorded. (Menstrual status: Birth Control).   Date of Last Pap: 2024    OB History    Para Term  AB Living   5       5     SAB IAB Ectopic Multiple Live Births "   4 1            # Outcome Date GA Lbr Jose/2nd Weight Sex Type Anes PTL Lv   5 SAB            4 SAB            3 SAB            2 SAB            1 IAB               Obstetric Comments   5 AB within the last 10 years       Review of Systems  Review of Systems       Objective   Physical Exam:   Constitutional: She is oriented to person, place, and time. She appears well-developed and well-nourished. No distress.                           Neurological: She is alert and oriented to person, place, and time.     Psychiatric: She has a normal mood and affect. Her behavior is normal. Judgment and thought content normal.            Assessment and Plan     1. PMS (premenstrual syndrome)             Plan:  Brook was seen today for follow-up.    Diagnoses and all orders for this visit:    PMS (premenstrual syndrome)  -     Progesterone; Future  -     Estradiol; Future  -     TESTOSTERONE; Future  -     FOLLICLE STIMULATING HORMONE; Future  -     LUTEINIZING HORMONE; Future  -     DHEA-Sulfate; Future     Experiencing worsening mood swings and weight gain on depo provera, but achieved amenorrhea.  Discussed possibly switching to Nexplanon or Mirena.  Discussed possibly trying continuous dosing Nuva Ring, but I am concerned about potential increased risk of stroke with estrogen containing contraception.  Pt asked about checking her hormone levels.  Explained that checking them while on depo provera will not provide any clinically useful information because depo provera suppresses ovulation.  Pt states she is willing to stop depo provera.  Last depo provera injection was 12/9/24.  Will plan labs 4/2025, then f/u for well-woman exam with pap and HPV co-test.

## 2025-03-18 ENCOUNTER — PATIENT MESSAGE (OUTPATIENT)
Dept: ALLERGY | Facility: CLINIC | Age: 35
End: 2025-03-18
Payer: COMMERCIAL

## 2025-03-18 DIAGNOSIS — G90.A POTS (POSTURAL ORTHOSTATIC TACHYCARDIA SYNDROME): Primary | ICD-10-CM

## 2025-03-19 ENCOUNTER — TELEPHONE (OUTPATIENT)
Dept: CARDIOLOGY | Facility: CLINIC | Age: 35
End: 2025-03-19
Payer: COMMERCIAL

## 2025-03-19 NOTE — TELEPHONE ENCOUNTER
Pt was notified that usually we referral dx pots pt to [place]. Pt was not taking that answer states I see a plain cardiologist in [place] with another clinic I wants to be scheduled at the [place]. I made sure that the pt understood that normally we do not treat this condition and the provider we had that did address it has retired. She verbalized understanding and still wanted an appt scheduled. Placed on dr jackson schedule pbr

## 2025-03-25 ENCOUNTER — PATIENT MESSAGE (OUTPATIENT)
Dept: OBSTETRICS AND GYNECOLOGY | Facility: CLINIC | Age: 35
End: 2025-03-25
Payer: COMMERCIAL

## 2025-03-25 DIAGNOSIS — N96 HISTORY OF RECURRENT MISCARRIAGES: Primary | ICD-10-CM

## 2025-04-01 ENCOUNTER — LAB VISIT (OUTPATIENT)
Dept: LAB | Facility: HOSPITAL | Age: 35
End: 2025-04-01
Attending: OBSTETRICS & GYNECOLOGY
Payer: COMMERCIAL

## 2025-04-01 DIAGNOSIS — N96 HISTORY OF RECURRENT MISCARRIAGES: ICD-10-CM

## 2025-04-01 DIAGNOSIS — N94.3 PMS (PREMENSTRUAL SYNDROME): ICD-10-CM

## 2025-04-01 LAB
(HCYS)2 SERPL-MCNC: 6.4 UMOL/L (ref 4–15.5)
DHEA-S SERPL-MCNC: 56.2 UG/DL (ref 95.8–511.7)
ESTRADIOL SERPL HS-MCNC: 70 PG/ML
FSH SERPL-ACNC: 4.64 MIU/ML
LH SERPL-ACNC: 9 MIU/ML
PROGEST SERPL-MCNC: 0.2 NG/ML
TESTOST SERPL-MCNC: 26 NG/DL (ref 5–73)

## 2025-04-01 PROCEDURE — 83002 ASSAY OF GONADOTROPIN (LH): CPT

## 2025-04-01 PROCEDURE — 84403 ASSAY OF TOTAL TESTOSTERONE: CPT

## 2025-04-01 PROCEDURE — 36415 COLL VENOUS BLD VENIPUNCTURE: CPT

## 2025-04-01 PROCEDURE — 84144 ASSAY OF PROGESTERONE: CPT

## 2025-04-01 PROCEDURE — 82670 ASSAY OF TOTAL ESTRADIOL: CPT

## 2025-04-01 PROCEDURE — 82627 DEHYDROEPIANDROSTERONE: CPT

## 2025-04-01 PROCEDURE — 83001 ASSAY OF GONADOTROPIN (FSH): CPT

## 2025-04-01 PROCEDURE — 83090 ASSAY OF HOMOCYSTEINE: CPT

## 2025-04-02 ENCOUNTER — RESULTS FOLLOW-UP (OUTPATIENT)
Dept: OBSTETRICS AND GYNECOLOGY | Facility: CLINIC | Age: 35
End: 2025-04-02

## 2025-04-10 DIAGNOSIS — G90.A POTS (POSTURAL ORTHOSTATIC TACHYCARDIA SYNDROME): Primary | ICD-10-CM

## 2025-04-11 ENCOUNTER — OFFICE VISIT (OUTPATIENT)
Dept: CARDIOLOGY | Facility: CLINIC | Age: 35
End: 2025-04-11
Payer: COMMERCIAL

## 2025-04-11 ENCOUNTER — HOSPITAL ENCOUNTER (OUTPATIENT)
Dept: CARDIOLOGY | Facility: HOSPITAL | Age: 35
Discharge: HOME | End: 2025-04-11
Attending: INTERNAL MEDICINE
Payer: COMMERCIAL

## 2025-04-11 VITALS
WEIGHT: 169.06 LBS | HEIGHT: 66 IN | DIASTOLIC BLOOD PRESSURE: 78 MMHG | HEART RATE: 78 BPM | OXYGEN SATURATION: 97 % | RESPIRATION RATE: 16 BRPM | SYSTOLIC BLOOD PRESSURE: 114 MMHG | BODY MASS INDEX: 27.17 KG/M2

## 2025-04-11 DIAGNOSIS — G90.A POTS (POSTURAL ORTHOSTATIC TACHYCARDIA SYNDROME): ICD-10-CM

## 2025-04-11 DIAGNOSIS — R42 DIZZINESS: ICD-10-CM

## 2025-04-11 DIAGNOSIS — R06.02 SOB (SHORTNESS OF BREATH): Primary | ICD-10-CM

## 2025-04-11 DIAGNOSIS — R00.2 PALPITATIONS: ICD-10-CM

## 2025-04-11 LAB
OHS QRS DURATION: 94 MS
OHS QTC CALCULATION: 428 MS

## 2025-04-11 PROCEDURE — 3008F BODY MASS INDEX DOCD: CPT | Mod: CPTII,S$GLB,, | Performed by: INTERNAL MEDICINE

## 2025-04-11 PROCEDURE — 1159F MED LIST DOCD IN RCRD: CPT | Mod: CPTII,S$GLB,, | Performed by: INTERNAL MEDICINE

## 2025-04-11 PROCEDURE — 99999 PR PBB SHADOW E&M-EST. PATIENT-LVL V: CPT | Mod: PBBFAC,,, | Performed by: INTERNAL MEDICINE

## 2025-04-11 PROCEDURE — 93005 ELECTROCARDIOGRAM TRACING: CPT

## 2025-04-11 PROCEDURE — 93010 ELECTROCARDIOGRAM REPORT: CPT | Mod: ,,, | Performed by: INTERNAL MEDICINE

## 2025-04-11 PROCEDURE — 3074F SYST BP LT 130 MM HG: CPT | Mod: CPTII,S$GLB,, | Performed by: INTERNAL MEDICINE

## 2025-04-11 PROCEDURE — 3078F DIAST BP <80 MM HG: CPT | Mod: CPTII,S$GLB,, | Performed by: INTERNAL MEDICINE

## 2025-04-11 PROCEDURE — 99205 OFFICE O/P NEW HI 60 MIN: CPT | Mod: S$GLB,,, | Performed by: INTERNAL MEDICINE

## 2025-04-11 RX ORDER — MIDODRINE HYDROCHLORIDE 2.5 MG/1
2.5 TABLET ORAL 3 TIMES DAILY PRN
Qty: 90 TABLET | Refills: 11 | Status: SHIPPED | OUTPATIENT
Start: 2025-04-11 | End: 2026-04-11

## 2025-04-11 RX ORDER — MIDODRINE HYDROCHLORIDE 2.5 MG/1
2.5 TABLET ORAL 3 TIMES DAILY
Qty: 90 TABLET | Refills: 11 | Status: SHIPPED | OUTPATIENT
Start: 2025-04-11 | End: 2025-04-11

## 2025-04-11 NOTE — PROGRESS NOTES
Subjective:   Patient ID:  Brook Abraham is a 34 y.o. female who presents for follow-up of No chief complaint on file.  Pt with dx of POTS  Sx SOB, brain fog, varying pulses , palpitations and BP.  BP 90/60s with dizziness and syncope ( 2-3 times)  Pt with hx of migraines x many years.   Pt with orthostatics sx  Pt not orthostatic on exam    11-24 echo (+) bubble study  CTA of coronaries nml  Stress test is nml    Cardiology note [facility] 11-24  Continues to complain of significant dizziness that can occur at any time worse with standing but also at rest in addition to having shortness of breath with activity and chest pain described as sharp pain  She had an exercise treadmill test that was performed. EKG part was negative patient was symptomatic at the time of exercise     Cardiology note 10-24 [facility]  Patient seen today to establish cardiology care. Expresses concerns for diagnosis of POTS.  Brook has a constellation of concerns. Reports a few month history of chest tightness/pain she describes as a pressure. Seems to worsen the more she talks but improves with relaxation and deep breathing. Pain is persistent throughout the day. She has exercise intolerance and becomes short of breath with minimal activity. Has symptoms of palpitations that present randomly. Brings a log of her smart watch readings. Complains of lightheadedness and dizziness that has been ongoing. Reports episode of near syncope after standing for 10 minutes while at work. She felt her heart racing, became lightheaded and then had to be assisted to the ground. Reports this has happened twice, last event was 3 weeks ago. Reports visual changes that occur regularly with associated brain fog. Has history of atypical migraines with little help from medication. Now using medical marijuana with improvement. Follows regularly with rheumatology and immunology. Family history of early CAD in her mother.     Echo [facility] 11-24    LEFT VENTRICLE         o The left ventricle is normal in size.      o Ejection Fraction = 60-65%.      o There is normal left ventricular wall thickness.     DIASTOLOGY       o Lateral e'= '18' cm/s.      o Septal e'= '14' cm/s.      o E/e' ='7'.      o LAESV index = '13' ml/m2.      o Normal Diastolic function.      o Normal Left Atrial Pressure.     RIGHT VENTRICLE        o The right ventricle is normal in size and function.     ATRIA       o The left atrial size is normal.      o Right atrial size is normal.      o Bubble Study 'Positive'.     MITRAL VALVE        o The mitral valve is normal in structure and function.      o There is trace mitral regurgitation.     TRICUSPID VALVE        o The tricuspid valve is normal in structure and function.      o There is mild tricuspid regurgitation.     AORTIC VALVE        o Normal tricuspid aortic valve.      o No aortic regurgitation is present.      o AV MAX PG 7.5 mmHg.     PULMONIC VALVE        o The pulmonic valve is normal in structure and function.      o There is no pulmonic valvular regurgitation.     GREAT VESSELS        o The aortic root is normal size.      o Normal size aorta.      o The pulmonary artery is not well visualized.     PERICARDIUM/PLEURAL EFFUSION        o There is no pericardial effusion.      o There is no pleural effusion.     Coronary CTA 12-24 [facility]  CORONARY ANATOMY:       Left main: Normal   LAD: Large vessel that reaches apex.  Gives rise to 1 large branching   diagonal and several septals.  The LAD is free of disease   Circumflex: Smaller vessel with several OM's.  The circumflex is free of   disease   RCA: Large dominant vessel with a large posterolateral branch and large   PDA.  The right coronary is free of disease   Dizziness:   Chronicity:  Chronic  Onset:  More than 1 year ago  Progression since onset:  Waxing and waning  Frequency:  Every few days  Severity:  Moderate  Duration:  Very brief  Dizziness characteristics:  Lightheaded/impending faint    Associated symptoms: palpitations and chest pain.  Aggravated by:  Nothing  Treatments tried:  Rest  Improvements on treatment:  Mild      Review of Systems   Constitutional: Negative. Negative for weight gain.   HENT: Negative.     Eyes: Negative.    Cardiovascular:  Positive for chest pain and palpitations. Negative for leg swelling.   Respiratory: Negative.  Negative for shortness of breath.    Endocrine: Negative.    Hematologic/Lymphatic: Negative.    Skin: Negative.    Musculoskeletal:  Negative for muscle weakness.   Gastrointestinal: Negative.    Genitourinary: Negative.    Neurological:  Positive for dizziness.   Psychiatric/Behavioral: Negative.     Allergic/Immunologic: Negative.    All other systems reviewed and are negative.    Family History   Problem Relation Name Age of Onset    Diabetes Paternal Grandfather      Diabetes Paternal Grandmother      Diabetes Maternal Grandmother      Breast cancer Maternal Grandmother      Diabetes Maternal Grandfather      Diabetes Mother      Ovarian cancer Mother       Past Medical History:   Diagnosis Date    PCOS (polycystic ovarian syndrome)     POTS (postural orthostatic tachycardia syndrome)      Social History[1]  Medications Ordered Prior to Encounter[2]  Review of patient's allergies indicates:   Allergen Reactions    Opioids - morphine analogues Anaphylaxis    Sulfa (sulfonamide antibiotics) Anaphylaxis, Hives and Shortness Of Breath       Objective:     Physical Exam  Vitals and nursing note reviewed.   Constitutional:       Appearance: She is well-developed.   HENT:      Head: Normocephalic and atraumatic.   Eyes:      Conjunctiva/sclera: Conjunctivae normal.      Pupils: Pupils are equal, round, and reactive to light.   Cardiovascular:      Rate and Rhythm: Normal rate and regular rhythm.      Pulses: Intact distal pulses.      Heart sounds: Normal heart sounds.   Pulmonary:      Effort: Pulmonary effort is normal.      Breath sounds: Normal breath  sounds.   Abdominal:      General: Bowel sounds are normal.      Palpations: Abdomen is soft.   Musculoskeletal:         General: Normal range of motion.      Cervical back: Normal range of motion and neck supple.   Skin:     General: Skin is warm and dry.   Neurological:      Mental Status: She is alert and oriented to person, place, and time.         Assessment:     1. POTS (postural orthostatic tachycardia syndrome)    2. CP  3. SOB  4. palpitations    Plan:     POTS (postural orthostatic tachycardia syndrome)  -     Ambulatory referral/consult to Cardiology      Tilt table  Neurology appt - POTS  Echo with bubble study         [1]   Social History  Socioeconomic History    Marital status:    Tobacco Use    Smoking status: Former     Types: Cigarettes    Smokeless tobacco: Never   Substance and Sexual Activity    Alcohol use: Yes    Drug use: Never    Sexual activity: Yes     Partners: Male     Birth control/protection: Partner-Vasectomy     Social Drivers of Health     Financial Resource Strain: Low Risk  (4/7/2025)    Overall Financial Resource Strain (CARDIA)     Difficulty of Paying Living Expenses: Not very hard   Food Insecurity: No Food Insecurity (4/7/2025)    Hunger Vital Sign     Worried About Running Out of Food in the Last Year: Never true     Ran Out of Food in the Last Year: Never true   Transportation Needs: No Transportation Needs (4/7/2025)    PRAPARE - Transportation     Lack of Transportation (Medical): No     Lack of Transportation (Non-Medical): No   Physical Activity: Insufficiently Active (4/7/2025)    Exercise Vital Sign     Days of Exercise per Week: 2 days     Minutes of Exercise per Session: 10 min   Stress: Stress Concern Present (4/7/2025)    [employer] [place] - Occupational Stress Questionnaire     Feeling of Stress : Very much   Housing Stability: Low Risk  (4/7/2025)    Housing Stability Vital Sign     Unable to Pay for Housing in the Last Year: No      Number of Times Moved in the Last Year: 0     Homeless in the Last Year: No   [2]   Current Outpatient Medications on File Prior to Visit   Medication Sig Dispense Refill    ascorbic acid, vitamin C, (VITAMIN C) 100 MG tablet Take 100 mg by mouth.      CALCIUM CIT-MAGNESIUM-D3-ZINC ORAL Take by mouth.      ELDERBERRY FRUIT ORAL Take by mouth.      Lactobacillus acidophilus 250 million cell Cap Take by mouth.      LYSINE ORAL Take by mouth.      mecobalamin, vitamin B12, 1,000 mcg Chew Take by mouth.      metoprolol succinate (TOPROL-XL) 25 MG 24 hr tablet Take 25 mg by mouth.      valACYclovir (VALTREX) 500 MG tablet Take 2 tablets (1,000 mg total) by mouth once daily AND 1 tablet (500 mg total) every evening. 90 tablet 11     Current Facility-Administered Medications on File Prior to Visit   Medication Dose Route Frequency Provider Last Rate Last Admin    medroxyPROGESTERone (DEPO-PROVERA) syringe 150 mg  150 mg Intramuscular Q90 Days    150 mg at 12/09/24 0808

## 2025-04-17 ENCOUNTER — PATIENT MESSAGE (OUTPATIENT)
Dept: CARDIOLOGY | Facility: CLINIC | Age: 35
End: 2025-04-17
Payer: COMMERCIAL

## 2025-04-17 ENCOUNTER — OFFICE VISIT (OUTPATIENT)
Dept: OBSTETRICS AND GYNECOLOGY | Facility: CLINIC | Age: 35
End: 2025-04-17
Payer: COMMERCIAL

## 2025-04-17 VITALS
HEIGHT: 66 IN | WEIGHT: 168.88 LBS | SYSTOLIC BLOOD PRESSURE: 128 MMHG | BODY MASS INDEX: 27.14 KG/M2 | DIASTOLIC BLOOD PRESSURE: 84 MMHG

## 2025-04-17 DIAGNOSIS — Z12.4 CERVICAL CANCER SCREENING: ICD-10-CM

## 2025-04-17 DIAGNOSIS — N94.3 PMS (PREMENSTRUAL SYNDROME): ICD-10-CM

## 2025-04-17 DIAGNOSIS — Z01.419 WELL WOMAN EXAM WITH ROUTINE GYNECOLOGICAL EXAM: Primary | ICD-10-CM

## 2025-04-17 DIAGNOSIS — E34.9 ABNORMALITY OF HORMONE: ICD-10-CM

## 2025-04-17 PROCEDURE — 99999 PR PBB SHADOW E&M-EST. PATIENT-LVL III: CPT | Mod: PBBFAC,,, | Performed by: OBSTETRICS & GYNECOLOGY

## 2025-04-17 RX ORDER — SODIUM FLUORIDE/POTASSIUM NIT 1.1 %-5 %
PASTE (ML) DENTAL
COMMUNITY
Start: 2025-01-15

## 2025-04-17 RX ORDER — PROGESTERONE 200 MG/1
200 CAPSULE ORAL NIGHTLY
Qty: 90 CAPSULE | Refills: 3 | Status: SHIPPED | OUTPATIENT
Start: 2025-04-17 | End: 2026-04-17

## 2025-04-17 NOTE — PROGRESS NOTES
"Subjective     Patient ID: Brook Abraham is a 34 y.o. female.    Chief Complaint:  Annual Exam      History of Present Illness  HPI  Presents for well-woman exam and to f/u recent labs.  To recap:  "Pt was diagnosed with PCOS in the past, although her testosterone and DHEA levels were always really low.  Periods are monthly, but irregular.  She has had several neurological issues (constant vision changes, right optic neruitis, now migraines, and memory issues).  All problems exacerbate just prior to her periods.  Has had normal MRI brain, normal findings on lumbar punctures, and negative rheum work-up.  Pt is MM with her .  Had several SAB's.  He had a vasectomy last year.  She has no desire for future childbearing.  Last pap: 10/2021: normal  Pt's MGM had breast cancer, and mom had ovarian cancer; patient underwent genetic testing, which was negative"     We discussed cyclic prometrium vs depo provera.  Pt opted to try cyclic prometrium.  When on it, she states she feels great.  The weeks off it, all her symptoms return.  Also, her first period was very heavy, and second period was lighter, but lasted longer.  Pt is ready to try depo provera."     Since last appointment 7/8/24, pt has received 3 doses of depo provera.  Achieved amenorrhea, which she likes b/c periods were irregular prior to that.  She notices more significant and frequent mood swings and weight gain.  Has POTS, so exercising without experiencing presyncope has been difficult.  Pt with hx of migraines.  Has had extensive neuro work-up.  Pt asked about checking hormone levels to see if that could be contributing to any of the symptoms she experienced.  States everything seemed to occur after she had mononucleosis."    Since last visit, pt stopped depo provera.  Labs done several months after her last depo injection:  DHEA-S decreased at 56.2 in spite of taking DHEA  LH 9  FSH 4.64  Testosterone 26  Progesterone 0.2  Estradiol " "70    Pt has repeat echo with bubble test scheduled to assess possible "hole in heart" seen on echo at [place], and will undergo tilt test to confirm dx of POTS in August.  Still have the neurologic symptoms mentioned above, and is willing to resume daily prometrium to see if that helps.    Last pap: 2024: normal, HPV +    GYN & OB History  No LMP recorded.   Date of Last Pap: 2024    OB History    Para Term  AB Living   5    5    SAB IAB Ectopic Multiple Live Births   4 1         # Outcome Date GA Lbr Jose/2nd Weight Sex Type Anes PTL Lv   5 SAB            4 SAB            3 SAB            2 SAB            1 IAB               Obstetric Comments   5 AB within the last 10 years       Review of Systems  Review of Systems       Objective   Physical Exam:   Constitutional: She is oriented to person, place, and time. She appears well-developed and well-nourished. No distress.      Neck: No thyromegaly present.     Pulmonary/Chest: Right breast exhibits no inverted nipple, no mass, no nipple discharge, no skin change, no tenderness, no bleeding and no swelling. Left breast exhibits no inverted nipple, no mass, no nipple discharge, no skin change, no tenderness, no bleeding and no swelling. Breasts are symmetrical.        Abdominal: Soft. She exhibits no distension and no mass. There is no abdominal tenderness. There is no rebound and no guarding.     Genitourinary:    Pelvic exam was performed with patient supine.   There is no rash, tenderness, lesion or injury on the right labia. There is no rash, tenderness, lesion or injury on the left labia. Cervix is normal. Right adnexum displays no mass, no tenderness and no fullness. Left adnexum displays no mass, no tenderness and no fullness. No erythema, vaginal discharge, tenderness, bleeding, rectocele or cystocele in the vagina.    No foreign body in the vagina.      No signs of injury in the vagina.   Cervix exhibits no motion tenderness, no " discharge and no friability. Uterus is not deviated, not enlarged, not fixed, not tender and not hosting fibroids.               Neurological: She is alert and oriented to person, place, and time.     Psychiatric: She has a normal mood and affect.            Assessment and Plan     1. Well woman exam with routine gynecological exam    2. Cervical cancer screening    3. Abnormality of hormone    4. PMS (premenstrual syndrome)             Plan:  Brook was seen today for annual exam.    Diagnoses and all orders for this visit:    Well woman exam with routine gynecological exam    Cervical cancer screening  -     Liquid-Based Pap Smear, Screening    Abnormality of hormone  -     Ambulatory referral/consult to Endocrinology; Future    PMS (premenstrual syndrome)  -     progesterone (PROMETRIUM) 200 MG capsule; Take 1 capsule (200 mg total) by mouth nightly.     Next pap based on today's results.  Did well on the days she took prometrium, and is willing to try it daily instead of cyclically.  New Rx sent.  Complete work-up with cardiology.  Refer to endocrinology for low DHEA-S  RTC 1 year or sooner prn.

## 2025-04-21 ENCOUNTER — PATIENT MESSAGE (OUTPATIENT)
Dept: OBSTETRICS AND GYNECOLOGY | Facility: CLINIC | Age: 35
End: 2025-04-21
Payer: COMMERCIAL

## 2025-04-24 ENCOUNTER — HOSPITAL ENCOUNTER (OUTPATIENT)
Dept: CARDIOLOGY | Facility: HOSPITAL | Age: 35
Discharge: HOME | End: 2025-04-24
Attending: INTERNAL MEDICINE
Payer: COMMERCIAL

## 2025-04-24 VITALS
SYSTOLIC BLOOD PRESSURE: 128 MMHG | DIASTOLIC BLOOD PRESSURE: 84 MMHG | HEIGHT: 66 IN | HEART RATE: 77 BPM | WEIGHT: 168 LBS | BODY MASS INDEX: 27 KG/M2

## 2025-04-24 DIAGNOSIS — R06.02 SOB (SHORTNESS OF BREATH): ICD-10-CM

## 2025-04-24 DIAGNOSIS — G90.A POTS (POSTURAL ORTHOSTATIC TACHYCARDIA SYNDROME): ICD-10-CM

## 2025-04-24 DIAGNOSIS — R42 DIZZINESS: ICD-10-CM

## 2025-04-24 DIAGNOSIS — R00.2 PALPITATIONS: ICD-10-CM

## 2025-04-24 LAB
AORTIC ROOT ANNULUS: 2.4 CM
ASCENDING AORTA: 2.3 CM
AV INDEX (PROSTH): 0.66
AV MEAN GRADIENT: 4 MMHG
AV PEAK GRADIENT: 7 MMHG
AV VALVE AREA BY VELOCITY RATIO: 2 CM²
AV VALVE AREA: 1.9 CM²
AV VELOCITY RATIO: 0.69
BSA FOR ECHO PROCEDURE: 1.88 M2
CV ECHO LV RWT: 0.36 CM
DOP CALC AO PEAK VEL: 1.3 M/S
DOP CALC AO VTI: 27.4 CM
DOP CALC LVOT AREA: 2.8 CM2
DOP CALC LVOT DIAMETER: 1.9 CM
DOP CALC LVOT PEAK VEL: 0.9 M/S
DOP CALC LVOT STROKE VOLUME: 51 CM3
DOP CALC RVOT PEAK VEL: 0.7 M/S
DOP CALC RVOT VTI: 16.7 CM
DOP CALCLVOT PEAK VEL VTI: 18 CM
E WAVE DECELERATION TIME: 212 MSEC
E/A RATIO: 1.72
E/E' RATIO: 6 M/S
ECHO LV POSTERIOR WALL: 0.8 CM (ref 0.6–1.1)
FRACTIONAL SHORTENING: 44.4 % (ref 28–44)
INTERVENTRICULAR SEPTUM: 0.8 CM (ref 0.6–1.1)
IVRT: 74 MSEC
LA MAJOR: 3.9 CM
LA MINOR: 3.7 CM
LA WIDTH: 2.9 CM
LEFT ATRIUM AREA SYSTOLIC (APICAL 2 CHAMBER): 9.53 CM2
LEFT ATRIUM AREA SYSTOLIC (APICAL 4 CHAMBER): 10.68 CM2
LEFT ATRIUM SIZE: 2.8 CM
LEFT ATRIUM VOLUME INDEX MOD: 10 ML/M2
LEFT ATRIUM VOLUME INDEX: 14 ML/M2
LEFT ATRIUM VOLUME MOD: 19 ML
LEFT ATRIUM VOLUME: 26 CM3
LEFT INTERNAL DIMENSION IN SYSTOLE: 2.5 CM (ref 2.1–4)
LEFT VENTRICLE DIASTOLIC VOLUME INDEX: 48.92 ML/M2
LEFT VENTRICLE DIASTOLIC VOLUME: 91 ML
LEFT VENTRICLE END SYSTOLIC VOLUME APICAL 2 CHAMBER: 17.52 ML
LEFT VENTRICLE END SYSTOLIC VOLUME APICAL 4 CHAMBER: 20.82 ML
LEFT VENTRICLE MASS INDEX: 61.1 G/M2
LEFT VENTRICLE SYSTOLIC VOLUME INDEX: 11.8 ML/M2
LEFT VENTRICLE SYSTOLIC VOLUME: 22 ML
LEFT VENTRICULAR INTERNAL DIMENSION IN DIASTOLE: 4.5 CM (ref 3.5–6)
LEFT VENTRICULAR MASS: 113.6 G
LV LATERAL E/E' RATIO: 6.3 M/S
LV SEPTAL E/E' RATIO: 6.3 M/S
LVED V (TEICH): 91.3 ML
LVES V (TEICH): 21.63 ML
LVOT MG: 1.79 MMHG
LVOT MV: 0.62 CM/S
MV PEAK A VEL: 0.58 M/S
MV PEAK E VEL: 1 M/S
MV STENOSIS PRESSURE HALF TIME: 61.61 MS
MV VALVE AREA P 1/2 METHOD: 3.57 CM2
OHS CV RV/LV RATIO: 0.58 CM
PISA TR MAX VEL: 1.8 M/S
PULM VEIN S/D RATIO: 0.87
PV MEAN GRADIENT: 1 MMHG
PV PEAK D VEL: 0.68 M/S
PV PEAK GRADIENT: 5 MMHG
PV PEAK S VEL: 0.59 M/S
PV PEAK VELOCITY: 1.08 M/S
RA MAJOR: 3.63 CM
RA PRESSURE ESTIMATED: 3 MMHG
RA WIDTH: 2.59 CM
RIGHT VENTRICLE DIASTOLIC BASEL DIMENSION: 2.6 CM
RIGHT VENTRICULAR END-DIASTOLIC DIMENSION: 2.6 CM
RV TB RVSP: 5 MMHG
SINUS: 1.97 CM
STJ: 1.9 CM
TDI LATERAL: 0.16 M/S
TDI SEPTAL: 0.16 M/S
TDI: 0.16 M/S
TR MAX PG: 13 MMHG
TV REST PULMONARY ARTERY PRESSURE: 16 MMHG
Z-SCORE OF LEFT VENTRICULAR DIMENSION IN END DIASTOLE: -1.4
Z-SCORE OF LEFT VENTRICULAR DIMENSION IN END SYSTOLE: -1.93

## 2025-04-24 PROCEDURE — 93306 TTE W/DOPPLER COMPLETE: CPT | Mod: 26,,, | Performed by: INTERNAL MEDICINE

## 2025-04-24 PROCEDURE — 25000003 PHARM REV CODE 250: Performed by: INTERNAL MEDICINE

## 2025-04-24 PROCEDURE — 93306 TTE W/DOPPLER COMPLETE: CPT

## 2025-04-24 PROCEDURE — A4216 STERILE WATER/SALINE, 10 ML: HCPCS | Performed by: INTERNAL MEDICINE

## 2025-04-24 RX ORDER — SODIUM CHLORIDE 0.9 % (FLUSH) 0.9 %
10 SYRINGE (ML) INJECTION
Status: COMPLETED | OUTPATIENT
Start: 2025-04-24 | End: 2025-04-24

## 2025-04-24 RX ADMIN — Medication 10 ML: at 09:04

## 2025-04-24 NOTE — NURSING NOTE
Pt presented for an echocardiogram with bubble study per Dr. Chacok.  Procedure was explained to the patient, she verbalized understanding.    Patient tolerated the procedure well.  IV discontinued, pressure dressing applied.

## 2025-04-25 ENCOUNTER — RESULTS FOLLOW-UP (OUTPATIENT)
Dept: CARDIOLOGY | Facility: CLINIC | Age: 35
End: 2025-04-25

## 2025-04-30 ENCOUNTER — RESULTS FOLLOW-UP (OUTPATIENT)
Dept: OBSTETRICS AND GYNECOLOGY | Facility: CLINIC | Age: 35
End: 2025-04-30

## 2025-06-03 ENCOUNTER — LAB VISIT (OUTPATIENT)
Dept: LAB | Facility: HOSPITAL | Age: 35
End: 2025-06-03
Attending: STUDENT IN AN ORGANIZED HEALTH CARE EDUCATION/TRAINING PROGRAM
Payer: COMMERCIAL

## 2025-06-03 ENCOUNTER — OFFICE VISIT (OUTPATIENT)
Dept: ALLERGY | Facility: CLINIC | Age: 35
End: 2025-06-03
Payer: COMMERCIAL

## 2025-06-03 VITALS
SYSTOLIC BLOOD PRESSURE: 129 MMHG | HEIGHT: 66 IN | DIASTOLIC BLOOD PRESSURE: 84 MMHG | OXYGEN SATURATION: 99 % | HEART RATE: 88 BPM | WEIGHT: 165.13 LBS | BODY MASS INDEX: 26.54 KG/M2

## 2025-06-03 DIAGNOSIS — B99.9 RECURRENT INFECTIONS: ICD-10-CM

## 2025-06-03 DIAGNOSIS — B99.9 RECURRENT INFECTIONS: Primary | ICD-10-CM

## 2025-06-03 DIAGNOSIS — B02.29 HZV (HERPES ZOSTER VIRUS) POST HERPETIC NEURALGIA: ICD-10-CM

## 2025-06-03 PROCEDURE — 86353 LYMPHOCYTE TRANSFORMATION: CPT

## 2025-06-03 PROCEDURE — 3008F BODY MASS INDEX DOCD: CPT | Mod: CPTII,S$GLB,, | Performed by: STUDENT IN AN ORGANIZED HEALTH CARE EDUCATION/TRAINING PROGRAM

## 2025-06-03 PROCEDURE — 99215 OFFICE O/P EST HI 40 MIN: CPT | Mod: S$GLB,,, | Performed by: STUDENT IN AN ORGANIZED HEALTH CARE EDUCATION/TRAINING PROGRAM

## 2025-06-03 PROCEDURE — 3079F DIAST BP 80-89 MM HG: CPT | Mod: CPTII,S$GLB,, | Performed by: STUDENT IN AN ORGANIZED HEALTH CARE EDUCATION/TRAINING PROGRAM

## 2025-06-03 PROCEDURE — 36415 COLL VENOUS BLD VENIPUNCTURE: CPT

## 2025-06-03 PROCEDURE — 99999 PR PBB SHADOW E&M-EST. PATIENT-LVL III: CPT | Mod: PBBFAC,,, | Performed by: STUDENT IN AN ORGANIZED HEALTH CARE EDUCATION/TRAINING PROGRAM

## 2025-06-03 PROCEDURE — 3074F SYST BP LT 130 MM HG: CPT | Mod: CPTII,S$GLB,, | Performed by: STUDENT IN AN ORGANIZED HEALTH CARE EDUCATION/TRAINING PROGRAM

## 2025-06-03 PROCEDURE — 86353 LYMPHOCYTE TRANSFORMATION: CPT | Mod: 59

## 2025-06-03 PROCEDURE — 1160F RVW MEDS BY RX/DR IN RCRD: CPT | Mod: CPTII,S$GLB,, | Performed by: STUDENT IN AN ORGANIZED HEALTH CARE EDUCATION/TRAINING PROGRAM

## 2025-06-03 PROCEDURE — 86360 T CELL ABSOLUTE COUNT/RATIO: CPT

## 2025-06-03 PROCEDURE — 1159F MED LIST DOCD IN RCRD: CPT | Mod: CPTII,S$GLB,, | Performed by: STUDENT IN AN ORGANIZED HEALTH CARE EDUCATION/TRAINING PROGRAM

## 2025-06-06 ENCOUNTER — PATIENT MESSAGE (OUTPATIENT)
Dept: OBSTETRICS AND GYNECOLOGY | Facility: CLINIC | Age: 35
End: 2025-06-06
Payer: COMMERCIAL

## 2025-06-06 LAB
CD19 CELLS NFR SPEC: 9 %
CD2 CELLS # BLD: 1450 CELLS/UL
CD2 CELLS NFR BLD: 81 %
CD3 CELLS # BLD: 1102 CELLS/UL
CD3 CELLS NFR SPEC: 67 %
CD3+CD4+ CELLS # BLD: 669 CELLS/UL
CD3+CD4+ CELLS # BLD: NORMAL /UL
CD3+CD4+ CELLS NFR BLD: 41 %
CD3+CD4+ CELLS/CD3+CD8+ CLL BLD: 1.78 RATIO
CD3+CD8+ CELLS # BLD: 385 CELLS/UL
CD3+CD8+ CELLS NFR SPEC: 23 %
CD3-CD16+CD56+ CELLS # SPEC: 382 CELLS/UL
CD3-CD16+CD56+ CELLS NFR SPEC: 23 %
CD4+CD45RA+ CELLS # BLD: 295 CELLS/UL
CD4+CD45RO+ CELLS # BLD: 425 CELLS/UL
CD4+CD45RO+ CELLS NFR BLD: 59 %
CD45RA CELLS NFR BLD: 41 %
CELLS.CD3-CD19+ [#/VOLUME] IN BLOOD: 143 CELLS/UL
HLA-DR+ CELLS # BLD: 213 CELLS/UL
HLA-DR+ CELLS NFR BLD: 12 %

## 2025-06-08 ENCOUNTER — RESULTS FOLLOW-UP (OUTPATIENT)
Dept: ALLERGY | Facility: CLINIC | Age: 35
End: 2025-06-08

## 2025-06-09 LAB
ANNOTATION COMMENT IMP: ABNORMAL
FLOW CYTOMETRY SPECIALIST REVIEW: ABNORMAL
LPT OKT3 BLD-NRATE: 68.4 %
LPT PHA MAX/CD45 NFR BLD FC: 24.8 %
LPT PW/CD3 NFR BLD FC: 5.4 %
LPT PW/CD45 NFR BLD FC: 3.1 %
VIABLE CELLS NFR BLD: 76.5 %

## 2025-06-12 LAB
ANNOTATION COMMENT IMP: ABNORMAL
FLOW CYTOMETRY SPECIALIST REVIEW: ABNORMAL
LPT CA MAX/CD3 BLD FC-NFR: 3.6 %
LPT CA MAX/CD45 BLD FC-NFR: 8.2 %
LPT TT MAX/CD3 BLD FC-NFR: 2.3 %
LPT TT MAX/CD45 BLD FC-NFR: 4.4 %
VIABLE CELLS NFR BLD: 76.5 %

## 2025-06-25 ENCOUNTER — PATIENT MESSAGE (OUTPATIENT)
Dept: ALLERGY | Facility: CLINIC | Age: 35
End: 2025-06-25
Payer: COMMERCIAL

## 2025-07-14 ENCOUNTER — PROCEDURE VISIT (OUTPATIENT)
Dept: OBSTETRICS AND GYNECOLOGY | Facility: CLINIC | Age: 35
End: 2025-07-14
Payer: COMMERCIAL

## 2025-07-14 VITALS — WEIGHT: 162.25 LBS | BODY MASS INDEX: 26.08 KG/M2 | HEIGHT: 66 IN

## 2025-07-14 DIAGNOSIS — Z01.818 PRE-PROCEDURAL EXAMINATION: ICD-10-CM

## 2025-07-14 DIAGNOSIS — R87.810 ATYPICAL SQUAMOUS CELL CHANGES OF UNDETERMINED SIGNIFICANCE (ASCUS) ON CERVICAL CYTOLOGY WITH POSITIVE HIGH RISK HUMAN PAPILLOMA VIRUS (HPV): Primary | ICD-10-CM

## 2025-07-14 DIAGNOSIS — R87.610 ATYPICAL SQUAMOUS CELL CHANGES OF UNDETERMINED SIGNIFICANCE (ASCUS) ON CERVICAL CYTOLOGY WITH POSITIVE HIGH RISK HUMAN PAPILLOMA VIRUS (HPV): Primary | ICD-10-CM

## 2025-07-14 LAB
B-HCG UR QL: NEGATIVE
CTP QC/QA: YES

## 2025-07-14 PROCEDURE — 81025 URINE PREGNANCY TEST: CPT | Mod: S$GLB,,, | Performed by: OBSTETRICS & GYNECOLOGY

## 2025-07-14 PROCEDURE — 57454 BX/CURETT OF CERVIX W/SCOPE: CPT | Mod: S$GLB,,, | Performed by: OBSTETRICS & GYNECOLOGY

## 2025-07-14 PROCEDURE — 88305 TISSUE EXAM BY PATHOLOGIST: CPT | Mod: TC,91 | Performed by: OBSTETRICS & GYNECOLOGY

## 2025-07-14 NOTE — PROCEDURES
Colposcopy    Date/Time: 7/14/2025 9:30 AM    Performed by: Teresa Segura MD  Authorized by: Teresa Segura MD    Consent obatined:  Prior to procedure the appropriate consent was completed and verified  Timeout:Immediately prior to procedure a time out was called to verify the correct patient, procedure, equipment, support staff and site/side marked as required  Prep:Patient was prepped and draped in the usual sterile fashion  Assistants?: Yes    List of assistants:  ANTONI Cheney was present for the entire procedure.    Colposcopy Site:  Cervix  Acrowhite Lesion? Yes (faint AW lesion from 11 to 1 o'clock)    Atypical Vessels? Yes (faint mosaicism within AW lesion from 11 to 1 o'clock)    Transformation Zone Adequate?: Yes    Biopsy?: Yes         Location:  Cervix ((12 00))  ECC Performed?: Yes    LEEP Performed?: No    Estimated blood loss (cc):  2   Patient tolerated the procedure well with no immediate complications.   Post-operative instructions were provided for the patient.   Patient was discharged and will follow up if any complications occur     negative...

## 2025-07-15 ENCOUNTER — PATIENT MESSAGE (OUTPATIENT)
Dept: OBSTETRICS AND GYNECOLOGY | Facility: CLINIC | Age: 35
End: 2025-07-15
Payer: COMMERCIAL

## 2025-07-15 DIAGNOSIS — R11.0 NAUSEA: Primary | ICD-10-CM

## 2025-07-15 RX ORDER — ONDANSETRON 8 MG/1
8 TABLET, ORALLY DISINTEGRATING ORAL EVERY 12 HOURS PRN
Qty: 20 TABLET | Refills: 0 | Status: SHIPPED | OUTPATIENT
Start: 2025-07-15

## 2025-07-16 ENCOUNTER — RESULTS FOLLOW-UP (OUTPATIENT)
Dept: OBSTETRICS AND GYNECOLOGY | Facility: CLINIC | Age: 35
End: 2025-07-16
Payer: COMMERCIAL

## 2025-07-16 LAB
ESTROGEN SERPL-MCNC: NORMAL PG/ML
INSULIN SERPL-ACNC: NORMAL U[IU]/ML
LAB AP DIAGNOSIS CATEGORY: NORMAL
LAB AP GROSS DESCRIPTION: NORMAL
LAB AP PERFORMING LOCATION(S): NORMAL
LAB AP REPORT FOOTNOTES: NORMAL

## 2025-08-11 ENCOUNTER — HOSPITAL ENCOUNTER (OUTPATIENT)
Dept: CARDIOLOGY | Facility: HOSPITAL | Age: 35
Discharge: HOME | End: 2025-08-11
Attending: INTERNAL MEDICINE
Payer: COMMERCIAL

## 2025-08-11 DIAGNOSIS — G90.A POTS (POSTURAL ORTHOSTATIC TACHYCARDIA SYNDROME): ICD-10-CM

## 2025-08-11 PROCEDURE — 93660 TILT TABLE EVALUATION: CPT

## 2025-08-11 PROCEDURE — 93660 TILT TABLE EVALUATION: CPT | Mod: 26,,, | Performed by: INTERNAL MEDICINE
